# Patient Record
Sex: MALE | Race: WHITE | NOT HISPANIC OR LATINO
[De-identification: names, ages, dates, MRNs, and addresses within clinical notes are randomized per-mention and may not be internally consistent; named-entity substitution may affect disease eponyms.]

---

## 2017-02-06 ENCOUNTER — RESULT REVIEW (OUTPATIENT)
Age: 47
End: 2017-02-06

## 2019-01-26 ENCOUNTER — INPATIENT (INPATIENT)
Facility: HOSPITAL | Age: 49
LOS: 1 days | Discharge: HOME | DRG: 282 | End: 2019-01-28
Attending: STUDENT IN AN ORGANIZED HEALTH CARE EDUCATION/TRAINING PROGRAM | Admitting: STUDENT IN AN ORGANIZED HEALTH CARE EDUCATION/TRAINING PROGRAM
Payer: COMMERCIAL

## 2019-01-26 VITALS
HEART RATE: 78 BPM | RESPIRATION RATE: 16 BRPM | DIASTOLIC BLOOD PRESSURE: 78 MMHG | SYSTOLIC BLOOD PRESSURE: 115 MMHG | TEMPERATURE: 98 F | OXYGEN SATURATION: 96 %

## 2019-01-26 DIAGNOSIS — R07.9 CHEST PAIN, UNSPECIFIED: ICD-10-CM

## 2019-01-26 LAB
ALBUMIN SERPL ELPH-MCNC: 4.2 G/DL — SIGNIFICANT CHANGE UP (ref 3.3–5)
ALP SERPL-CCNC: 73 U/L — SIGNIFICANT CHANGE UP (ref 40–120)
ALT FLD-CCNC: 36 U/L — SIGNIFICANT CHANGE UP (ref 10–45)
ANION GAP SERPL CALC-SCNC: 12 MMOL/L — SIGNIFICANT CHANGE UP (ref 5–17)
APTT BLD: 28 SEC — SIGNIFICANT CHANGE UP (ref 27.5–36.3)
AST SERPL-CCNC: 16 U/L — SIGNIFICANT CHANGE UP (ref 10–40)
BILIRUB SERPL-MCNC: 0.4 MG/DL — SIGNIFICANT CHANGE UP (ref 0.2–1.2)
BUN SERPL-MCNC: 11 MG/DL — SIGNIFICANT CHANGE UP (ref 7–23)
CALCIUM SERPL-MCNC: 8.9 MG/DL — SIGNIFICANT CHANGE UP (ref 8.4–10.5)
CHLORIDE SERPL-SCNC: 102 MMOL/L — SIGNIFICANT CHANGE UP (ref 96–108)
CO2 SERPL-SCNC: 25 MMOL/L — SIGNIFICANT CHANGE UP (ref 22–31)
CREAT SERPL-MCNC: 0.92 MG/DL — SIGNIFICANT CHANGE UP (ref 0.5–1.3)
GLUCOSE SERPL-MCNC: 106 MG/DL — HIGH (ref 70–99)
HCT VFR BLD CALC: 41.6 % — SIGNIFICANT CHANGE UP (ref 39–50)
HGB BLD-MCNC: 14.7 G/DL — SIGNIFICANT CHANGE UP (ref 13–17)
INR BLD: 0.97 RATIO — SIGNIFICANT CHANGE UP (ref 0.88–1.16)
MCHC RBC-ENTMCNC: 31.4 PG — SIGNIFICANT CHANGE UP (ref 27–34)
MCHC RBC-ENTMCNC: 35.3 GM/DL — SIGNIFICANT CHANGE UP (ref 32–36)
MCV RBC AUTO: 88.9 FL — SIGNIFICANT CHANGE UP (ref 80–100)
PLATELET # BLD AUTO: 215 K/UL — SIGNIFICANT CHANGE UP (ref 150–400)
POTASSIUM SERPL-MCNC: 4 MMOL/L — SIGNIFICANT CHANGE UP (ref 3.5–5.3)
POTASSIUM SERPL-SCNC: 4 MMOL/L — SIGNIFICANT CHANGE UP (ref 3.5–5.3)
PROT SERPL-MCNC: 7.1 G/DL — SIGNIFICANT CHANGE UP (ref 6–8.3)
PROTHROM AB SERPL-ACNC: 11.1 SEC — SIGNIFICANT CHANGE UP (ref 10–12.9)
RBC # BLD: 4.68 M/UL — SIGNIFICANT CHANGE UP (ref 4.2–5.8)
RBC # FLD: 12.1 % — SIGNIFICANT CHANGE UP (ref 10.3–14.5)
SODIUM SERPL-SCNC: 139 MMOL/L — SIGNIFICANT CHANGE UP (ref 135–145)
TROPONIN T, HIGH SENSITIVITY RESULT: <6 NG/L — SIGNIFICANT CHANGE UP (ref 0–51)
WBC # BLD: 8.8 K/UL — SIGNIFICANT CHANGE UP (ref 3.8–10.5)
WBC # FLD AUTO: 8.8 K/UL — SIGNIFICANT CHANGE UP (ref 3.8–10.5)

## 2019-01-26 PROCEDURE — 71046 X-RAY EXAM CHEST 2 VIEWS: CPT | Mod: 26

## 2019-01-26 PROCEDURE — 99285 EMERGENCY DEPT VISIT HI MDM: CPT | Mod: GC,25

## 2019-01-26 PROCEDURE — 99223 1ST HOSP IP/OBS HIGH 75: CPT

## 2019-01-26 PROCEDURE — 93010 ELECTROCARDIOGRAM REPORT: CPT | Mod: GC

## 2019-01-26 RX ORDER — KETOROLAC TROMETHAMINE 30 MG/ML
15 SYRINGE (ML) INJECTION ONCE
Qty: 0 | Refills: 0 | Status: DISCONTINUED | OUTPATIENT
Start: 2019-01-26 | End: 2019-01-26

## 2019-01-26 RX ORDER — ACETAMINOPHEN 500 MG
1000 TABLET ORAL ONCE
Qty: 0 | Refills: 0 | Status: COMPLETED | OUTPATIENT
Start: 2019-01-26 | End: 2019-01-26

## 2019-01-26 RX ORDER — ASPIRIN/CALCIUM CARB/MAGNESIUM 324 MG
162 TABLET ORAL ONCE
Qty: 0 | Refills: 0 | Status: COMPLETED | OUTPATIENT
Start: 2019-01-26 | End: 2019-01-26

## 2019-01-26 RX ADMIN — Medication 400 MILLIGRAM(S): at 21:51

## 2019-01-26 RX ADMIN — Medication 162 MILLIGRAM(S): at 21:06

## 2019-01-26 RX ADMIN — Medication 1000 MILLIGRAM(S): at 23:26

## 2019-01-26 RX ADMIN — Medication 15 MILLIGRAM(S): at 21:51

## 2019-01-26 NOTE — H&P ADULT - NSHPLABSRESULTS_GEN_ALL_CORE
Labs, imaging and EKG personally reviewed and interpreted by me - normal electrolytes, LFTs, hs trop <6, repeat pending. CXR with no focal consolidation, no effusions. EKG NSR no acute ischemic changes, no TWI/q waves, no changes from prior EKG (2015).                           14.7   8.8   )-----------( 215      ( 26 Jan 2019 20:57 )             41.6     01-26    139  |  102  |  11  ----------------------------<  106<H>  4.0   |  25  |  0.92    Ca    8.9      26 Jan 2019 20:57    TPro  7.1  /  Alb  4.2  /  TBili  0.4  /  DBili  x   /  AST  16  /  ALT  36  /  AlkPhos  73  01-26      PT/INR - ( 26 Jan 2019 20:57 )   PT: 11.1 sec;   INR: 0.97 ratio    PTT - ( 26 Jan 2019 20:57 )  PTT:28.0 sec    Troponin T, High Sensitivity Result: <6     < from: Xray Chest 2 Views PA/Lat (01.26.19 @ 21:32) >  ******PRELIMINARY REPORT******        INTERPRETATION:  Clear lungs

## 2019-01-26 NOTE — ED ADULT NURSE REASSESSMENT NOTE - NS ED NURSE REASSESS COMMENT FT1
As per MD Dai awaiting patient's blood lab results to order appropriate pain medication, patient's blood pressure systollically has been consistently 104, with diastolic 70's-80s. Safety maintained for patient.

## 2019-01-26 NOTE — H&P ADULT - PROBLEM SELECTOR PLAN 2
CAD s/p 5 stents in past   last cath 7/2015 showing patents stents and no disease   c/w ASA 81mg, lipitor 80mg, toprol 25mg and losartan 25mg

## 2019-01-26 NOTE — ED ADULT NURSE NOTE - NSIMPLEMENTINTERV_GEN_ALL_ED
Implemented All Fall with Harm Risk Interventions:  [place] to call system. Call bell, personal items and telephone within reach. Instruct patient to call for assistance. Room bathroom lighting operational. Non-slip footwear when patient is off stretcher. Physically safe environment: no spills, clutter or unnecessary equipment. Stretcher in lowest position, wheels locked, appropriate side rails in place. Provide visual cue, wrist band, yellow gown, etc. Monitor gait and stability. Monitor for mental status changes and reorient to person, place, and time. Review medications for side effects contributing to fall risk. Reinforce activity limits and safety measures with patient and family. Provide visual clues: red socks.

## 2019-01-26 NOTE — H&P ADULT - NSHPREVIEWOFSYSTEMS_GEN_ALL_CORE
REVIEW OF SYSTEMS:    CONSTITUTIONAL: No weakness, fevers, chills  EYES/ENT: No visual changes, throat pain   NECK: No pain or stiffness  RESPIRATORY: +mild cough, no wheezing, no shortness of breath  CARDIOVASCULAR: +chest pain, +palpitations, no LE edema   GASTROINTESTINAL: +nausea, no vomiting, no abdominal pain, no BRBPR  GENITOURINARY: no polyuria, no dysuria, no hematuria   NEUROLOGICAL: +numbness in LE fingers, no headaches, no confusion   MUSCULOSKELETAL: no back pain, no weakness   SKIN: No rashes, or lesions   PSYCH: no anxiety, depression

## 2019-01-26 NOTE — H&P ADULT - NSHPSOCIALHISTORY_GEN_ALL_CORE
former smoker (quit 3 years ago, 15 pack years), no etoh, no drugs   on disability, lives at home with wife and children

## 2019-01-26 NOTE — ED PROVIDER NOTE - NS ED ROS FT
CONSTITUTIONAL: No fevers, no chills  Eyes: no visual changes  Ears: no ear drainage, no ear pain  Nose: no nasal congestion  Mouth/Throat: no sore throat  Cardiovascular: + Chest pain  Respiratory: + SOB  Gastrointestinal: +nausea, no abd pain  Genitourinary: no dysuria, no hematuria  SKIN: no rashes.  NEURO: no headache

## 2019-01-26 NOTE — ED ADULT NURSE NOTE - CHPI ED NUR SYMPTOMS NEG
no diaphoresis/no fever/no nausea/no syncope/no congestion/no dizziness/no back pain/no chills/no shortness of breath/no vomiting

## 2019-01-26 NOTE — ED ADULT NURSE NOTE - OBJECTIVE STATEMENT
49 YOM A&Ox3 brought in by EMS to ED for constant 5/10 squeezing chest pain that radiates to left arm that began at 5pm today. Patient stated was home and did not feel well, went to urgent care who sent patient to [facility]. Patient placed on cardiac monitor shows NSR. Patient denies SOB, N/V/D, dizziness, diaphoresis, headache & blurry vision at this time. Cardiac history includes 5 stents and hypertension. Patient stated took all home medications today.

## 2019-01-26 NOTE — H&P ADULT - ASSESSMENT
49M with CAD s/p 5 stents (FITO x1 1st OM, x3 mCx 2014; FITO x1 pLAD 2015), HTN, ADHD, depression/anxiety p/w chest pain, admitted for further work-up.

## 2019-01-26 NOTE — ED PROVIDER NOTE - ATTENDING CONTRIBUTION TO CARE
Nemes - 50yo M pmh CAD with 5 stents, HTN, HLD p/w severe midsternal chest pain 1 hour prior to arrival with associated diaphoresis, waxing/waning, min SOB. Persistent CP upon exam but better. No other stx. VS wnl, well appearing, lungs/cardiac wnl, no JVD, no pedal edema. Low suspicion PE/dissection, likely ACS, will get cardiac w/u, admit

## 2019-01-26 NOTE — H&P ADULT - PROBLEM SELECTOR PLAN 6
pt with Barrets esophagus, s/p recent EGD per pt report showing no dysplasia   c/w protonix 40 BID  c/w reglan 5 mg QID

## 2019-01-26 NOTE — H&P ADULT - NSHPPHYSICALEXAM_GEN_ALL_CORE
Vital Signs Last 24 Hrs  T(C): 36.4 (26 Jan 2019 23:25), Max: 36.6 (26 Jan 2019 20:51)  T(F): 97.5 (26 Jan 2019 23:25), Max: 97.9 (26 Jan 2019 20:51)  HR: 86 (26 Jan 2019 23:25) (70 - 86)  BP: 106/77 (26 Jan 2019 23:25) (104/71 - 115/78)  BP(mean): --  RR: 20 (26 Jan 2019 23:25) (16 - 20)  SpO2: 98% (26 Jan 2019 23:25) (96% - 98%)    PHYSICAL EXAM:  GENERAL: NAD, well-developed  HEAD:  Atraumatic, normocephalic  EYES: EOMI, PERRLA, conjunctiva and sclera clear  NECK: Supple, no JVD, moist MM  CHEST/LUNG: Clear to auscultation bilaterally; no wheezing or rales  HEART: Regular rate and rhythm; no murmurs  ABDOMEN: Soft, nontender, nondistended; bowel sounds present  EXTREMITIES:  2+ Peripheral Pulses, no clubbing, cyanosis, or edema  PSYCH: calm affect, not anxious  NEUROLOGY: non-focal, AAOx3  SKIN: No rashes or lesions  MUSCULOSKELETAL: no back pain, moving all extremities

## 2019-01-26 NOTE — ED ADULT NURSE REASSESSMENT NOTE - NS ED NURSE REASSESS COMMENT FT1
Patient at this time appear to be in no acute distress, vital signs are stable. Patient states chest pain decreased to 3/10, patient denies sob, n/v/d, headache, blurry vision. Patient admitted, awaiting bed.

## 2019-01-26 NOTE — ED ADULT NURSE NOTE - RADIATION
Patient's appear to be currently compensated    Continue Aldactone, Lasix and Zaroxolyn  Patient is status post AICD arm/left arm

## 2019-01-26 NOTE — H&P ADULT - HISTORY OF PRESENT ILLNESS
49M with CAD s/p 5 stents (FITO x1 1st OM, x3 mCx 2014; FITO x1 pLAD 2015), HTN, ADHD, depression/anxiety p/w chest pain. Pt states around 6PM on evening of arrival started feeling nauseous, lightheaded and flushed while driving to [facility]. On arrival home around 7PM, started having acute onset chest pain, substernal, 9/10, squeezing in nature, radiating to L shoulder and arm, with numbness/tingling in fingertips, mild palpitations, no SOB, no vomiting; nothing made the pain better or worse, no positional component to pain. States pain is similar to CP he's had in the past when he has required stenting.     Pt took an old nitro SL he ahd at home without any improvement in pain; then took ASA 162mg and went to  where he was told to come to ED. Got nitro en route with EMS and states pain was moderately relieved after that. Rcvd another 162mg of ASA in ED.  Currently states pain is 3/10, mild tingling in fingertips, otherwise denies symptoms. Recently has been getting over a mild URI, endorsing cough and congestion; no fevers, chills, no CP prior to today, no SOB, no  ir GI symptoms, no LE edema.      No family hx of early CAD; elderly father with recent CAD with stent placement at age 80.

## 2019-01-26 NOTE — ED PROVIDER NOTE - OBJECTIVE STATEMENT
49 YOM pmh CAD with 5 stents, HTN, HLD p/w severe midsternal chest pain 1 hour prior to arrival with associated diaphoresis that resolved and slight SOB that resolved. pt states he was driving to airport when he started to get a little diaphoretic and SOB and nauseated and then started to develop midsternal chest pain. Pt took nitro at home which improved pain, also took 162mg aspirin. Pt also got nitro per ems which resolved pain. pt states pain is similar to past episodes.

## 2019-01-26 NOTE — H&P ADULT - PROBLEM SELECTOR PLAN 4
pt on klonopin 0.5mg TID prn, trazadone 50 qh  iSTOP #[id number], klonopin dose verified   will c/w 0.5mg TID prn anxiety

## 2019-01-26 NOTE — H&P ADULT - PROBLEM SELECTOR PLAN 1
acute on set SS CP rad to arms, in setting of hx of extensive CAD, HTN and former smoker  EKG without ischemic changes  hs trop <6, repeat pending   serial EKGs  if trop rising, will c/t trend   s/p ASA load - c/w ASA 81mg and high dose statin for now  c/w BB and ARB   monitor on tele   check TTE   cardiology consulted for possible stress vs cath  nitro prn for chest pain if BP allows

## 2019-01-26 NOTE — ED PROVIDER NOTE - MEDICAL DECISION MAKING DETAILS
Chest pain with significant hx, previously stented multiple times will get labs, workup for ACS, ekg without stemi currently. Will admit for cath.

## 2019-01-27 DIAGNOSIS — I10 ESSENTIAL (PRIMARY) HYPERTENSION: ICD-10-CM

## 2019-01-27 DIAGNOSIS — G47.00 INSOMNIA, UNSPECIFIED: ICD-10-CM

## 2019-01-27 DIAGNOSIS — F41.9 ANXIETY DISORDER, UNSPECIFIED: ICD-10-CM

## 2019-01-27 DIAGNOSIS — I25.10 ATHEROSCLEROTIC HEART DISEASE OF NATIVE CORONARY ARTERY WITHOUT ANGINA PECTORIS: ICD-10-CM

## 2019-01-27 DIAGNOSIS — Z29.9 ENCOUNTER FOR PROPHYLACTIC MEASURES, UNSPECIFIED: ICD-10-CM

## 2019-01-27 DIAGNOSIS — F32.9 MAJOR DEPRESSIVE DISORDER, SINGLE EPISODE, UNSPECIFIED: ICD-10-CM

## 2019-01-27 DIAGNOSIS — E78.5 HYPERLIPIDEMIA, UNSPECIFIED: ICD-10-CM

## 2019-01-27 DIAGNOSIS — I21.4 NON-ST ELEVATION (NSTEMI) MYOCARDIAL INFARCTION: ICD-10-CM

## 2019-01-27 DIAGNOSIS — R07.9 CHEST PAIN, UNSPECIFIED: ICD-10-CM

## 2019-01-27 DIAGNOSIS — F90.9 ATTENTION-DEFICIT HYPERACTIVITY DISORDER, UNSPECIFIED TYPE: ICD-10-CM

## 2019-01-27 DIAGNOSIS — K22.70 BARRETT'S ESOPHAGUS WITHOUT DYSPLASIA: ICD-10-CM

## 2019-01-27 LAB
ANION GAP SERPL CALC-SCNC: 11 MMOL/L — SIGNIFICANT CHANGE UP (ref 5–17)
ANION GAP SERPL CALC-SCNC: 12 MMOL/L — SIGNIFICANT CHANGE UP (ref 5–17)
APTT BLD: 48.8 SEC — HIGH (ref 27.5–36.3)
BUN SERPL-MCNC: 13 MG/DL — SIGNIFICANT CHANGE UP (ref 7–23)
BUN SERPL-MCNC: 14 MG/DL — SIGNIFICANT CHANGE UP (ref 7–23)
CALCIUM SERPL-MCNC: 8.5 MG/DL — SIGNIFICANT CHANGE UP (ref 8.4–10.5)
CALCIUM SERPL-MCNC: 8.7 MG/DL — SIGNIFICANT CHANGE UP (ref 8.4–10.5)
CHLORIDE SERPL-SCNC: 103 MMOL/L — SIGNIFICANT CHANGE UP (ref 96–108)
CHLORIDE SERPL-SCNC: 104 MMOL/L — SIGNIFICANT CHANGE UP (ref 96–108)
CK MB BLD-MCNC: 2.7 % — SIGNIFICANT CHANGE UP (ref 0–3.5)
CK MB BLD-MCNC: 3.4 % — SIGNIFICANT CHANGE UP (ref 0–3.5)
CK MB CFR SERPL CALC: 4.2 NG/ML — SIGNIFICANT CHANGE UP (ref 0–6.7)
CK MB CFR SERPL CALC: 5.1 NG/ML — SIGNIFICANT CHANGE UP (ref 0–6.7)
CK SERPL-CCNC: 152 U/L — SIGNIFICANT CHANGE UP (ref 30–200)
CK SERPL-CCNC: 156 U/L — SIGNIFICANT CHANGE UP (ref 30–200)
CO2 SERPL-SCNC: 23 MMOL/L — SIGNIFICANT CHANGE UP (ref 22–31)
CO2 SERPL-SCNC: 24 MMOL/L — SIGNIFICANT CHANGE UP (ref 22–31)
CREAT SERPL-MCNC: 0.95 MG/DL — SIGNIFICANT CHANGE UP (ref 0.5–1.3)
CREAT SERPL-MCNC: 1.01 MG/DL — SIGNIFICANT CHANGE UP (ref 0.5–1.3)
GLUCOSE SERPL-MCNC: 90 MG/DL — SIGNIFICANT CHANGE UP (ref 70–99)
GLUCOSE SERPL-MCNC: 99 MG/DL — SIGNIFICANT CHANGE UP (ref 70–99)
HCT VFR BLD CALC: 38 % — LOW (ref 39–50)
HGB BLD-MCNC: 13.5 G/DL — SIGNIFICANT CHANGE UP (ref 13–17)
MAGNESIUM SERPL-MCNC: 1.8 MG/DL — SIGNIFICANT CHANGE UP (ref 1.6–2.6)
MCHC RBC-ENTMCNC: 31.8 PG — SIGNIFICANT CHANGE UP (ref 27–34)
MCHC RBC-ENTMCNC: 35.5 GM/DL — SIGNIFICANT CHANGE UP (ref 32–36)
MCV RBC AUTO: 89.4 FL — SIGNIFICANT CHANGE UP (ref 80–100)
PHOSPHATE SERPL-MCNC: 4.2 MG/DL — SIGNIFICANT CHANGE UP (ref 2.5–4.5)
PLATELET # BLD AUTO: 187 K/UL — SIGNIFICANT CHANGE UP (ref 150–400)
POTASSIUM SERPL-MCNC: 3.6 MMOL/L — SIGNIFICANT CHANGE UP (ref 3.5–5.3)
POTASSIUM SERPL-MCNC: 4 MMOL/L — SIGNIFICANT CHANGE UP (ref 3.5–5.3)
POTASSIUM SERPL-SCNC: 3.6 MMOL/L — SIGNIFICANT CHANGE UP (ref 3.5–5.3)
POTASSIUM SERPL-SCNC: 4 MMOL/L — SIGNIFICANT CHANGE UP (ref 3.5–5.3)
RBC # BLD: 4.25 M/UL — SIGNIFICANT CHANGE UP (ref 4.2–5.8)
RBC # FLD: 11.9 % — SIGNIFICANT CHANGE UP (ref 10.3–14.5)
SODIUM SERPL-SCNC: 138 MMOL/L — SIGNIFICANT CHANGE UP (ref 135–145)
SODIUM SERPL-SCNC: 139 MMOL/L — SIGNIFICANT CHANGE UP (ref 135–145)
TROPONIN T, HIGH SENSITIVITY RESULT: 37 NG/L — SIGNIFICANT CHANGE UP (ref 0–51)
TROPONIN T, HIGH SENSITIVITY RESULT: 61 NG/L — HIGH (ref 0–51)
TROPONIN T, HIGH SENSITIVITY RESULT: 63 NG/L — HIGH (ref 0–51)
WBC # BLD: 6.3 K/UL — SIGNIFICANT CHANGE UP (ref 3.8–10.5)
WBC # FLD AUTO: 6.3 K/UL — SIGNIFICANT CHANGE UP (ref 3.8–10.5)

## 2019-01-27 PROCEDURE — 99223 1ST HOSP IP/OBS HIGH 75: CPT

## 2019-01-27 PROCEDURE — 99233 SBSQ HOSP IP/OBS HIGH 50: CPT

## 2019-01-27 PROCEDURE — 93010 ELECTROCARDIOGRAM REPORT: CPT

## 2019-01-27 PROCEDURE — 93010 ELECTROCARDIOGRAM REPORT: CPT | Mod: 76

## 2019-01-27 RX ORDER — PANTOPRAZOLE SODIUM 20 MG/1
0 TABLET, DELAYED RELEASE ORAL
Qty: 0 | Refills: 0 | COMMUNITY

## 2019-01-27 RX ORDER — ENOXAPARIN SODIUM 100 MG/ML
40 INJECTION SUBCUTANEOUS EVERY 24 HOURS
Qty: 0 | Refills: 0 | Status: DISCONTINUED | OUTPATIENT
Start: 2019-01-27 | End: 2019-01-27

## 2019-01-27 RX ORDER — METOPROLOL TARTRATE 50 MG
25 TABLET ORAL DAILY
Qty: 0 | Refills: 0 | Status: DISCONTINUED | OUTPATIENT
Start: 2019-01-27 | End: 2019-01-27

## 2019-01-27 RX ORDER — CLONAZEPAM 1 MG
1 TABLET ORAL
Qty: 0 | Refills: 0 | COMMUNITY

## 2019-01-27 RX ORDER — ACETAMINOPHEN 500 MG
650 TABLET ORAL EVERY 6 HOURS
Qty: 0 | Refills: 0 | Status: DISCONTINUED | OUTPATIENT
Start: 2019-01-27 | End: 2019-01-28

## 2019-01-27 RX ORDER — LOSARTAN POTASSIUM 100 MG/1
25 TABLET, FILM COATED ORAL DAILY
Qty: 0 | Refills: 0 | Status: DISCONTINUED | OUTPATIENT
Start: 2019-01-27 | End: 2019-01-27

## 2019-01-27 RX ORDER — TICAGRELOR 90 MG/1
180 TABLET ORAL ONCE
Qty: 0 | Refills: 0 | Status: COMPLETED | OUTPATIENT
Start: 2019-01-27 | End: 2019-01-27

## 2019-01-27 RX ORDER — ESZOPICLONE 2 MG/1
1 TABLET, COATED ORAL
Qty: 0 | Refills: 0 | COMMUNITY

## 2019-01-27 RX ORDER — HEPARIN SODIUM 5000 [USP'U]/ML
6000 INJECTION INTRAVENOUS; SUBCUTANEOUS EVERY 6 HOURS
Qty: 0 | Refills: 0 | Status: DISCONTINUED | OUTPATIENT
Start: 2019-01-27 | End: 2019-01-28

## 2019-01-27 RX ORDER — ATORVASTATIN CALCIUM 80 MG/1
80 TABLET, FILM COATED ORAL AT BEDTIME
Qty: 0 | Refills: 0 | Status: DISCONTINUED | OUTPATIENT
Start: 2019-01-27 | End: 2019-01-28

## 2019-01-27 RX ORDER — METOCLOPRAMIDE HCL 10 MG
5 TABLET ORAL
Qty: 0 | Refills: 0 | Status: DISCONTINUED | OUTPATIENT
Start: 2019-01-27 | End: 2019-01-28

## 2019-01-27 RX ORDER — ASPIRIN/CALCIUM CARB/MAGNESIUM 324 MG
81 TABLET ORAL DAILY
Qty: 0 | Refills: 0 | Status: DISCONTINUED | OUTPATIENT
Start: 2019-01-27 | End: 2019-01-28

## 2019-01-27 RX ORDER — PANTOPRAZOLE SODIUM 20 MG/1
40 TABLET, DELAYED RELEASE ORAL
Qty: 0 | Refills: 0 | Status: DISCONTINUED | OUTPATIENT
Start: 2019-01-27 | End: 2019-01-28

## 2019-01-27 RX ORDER — ESCITALOPRAM OXALATE 10 MG/1
1 TABLET, FILM COATED ORAL
Qty: 0 | Refills: 0 | COMMUNITY

## 2019-01-27 RX ORDER — ESCITALOPRAM OXALATE 10 MG/1
20 TABLET, FILM COATED ORAL DAILY
Qty: 0 | Refills: 0 | Status: DISCONTINUED | OUTPATIENT
Start: 2019-01-27 | End: 2019-01-28

## 2019-01-27 RX ORDER — HEPARIN SODIUM 5000 [USP'U]/ML
INJECTION INTRAVENOUS; SUBCUTANEOUS
Qty: 25000 | Refills: 0 | Status: DISCONTINUED | OUTPATIENT
Start: 2019-01-27 | End: 2019-01-28

## 2019-01-27 RX ORDER — ENOXAPARIN SODIUM 100 MG/ML
120 INJECTION SUBCUTANEOUS EVERY 12 HOURS
Qty: 0 | Refills: 0 | Status: DISCONTINUED | OUTPATIENT
Start: 2019-01-27 | End: 2019-01-27

## 2019-01-27 RX ORDER — LOSARTAN POTASSIUM 100 MG/1
1 TABLET, FILM COATED ORAL
Qty: 0 | Refills: 0 | COMMUNITY

## 2019-01-27 RX ORDER — NITROGLYCERIN 6.5 MG
0.4 CAPSULE, EXTENDED RELEASE ORAL ONCE
Qty: 0 | Refills: 0 | Status: COMPLETED | OUTPATIENT
Start: 2019-01-27 | End: 2019-01-27

## 2019-01-27 RX ORDER — CLONAZEPAM 1 MG
0.5 TABLET ORAL THREE TIMES A DAY
Qty: 0 | Refills: 0 | Status: DISCONTINUED | OUTPATIENT
Start: 2019-01-27 | End: 2019-01-28

## 2019-01-27 RX ORDER — METHYLPHENIDATE HCL 5 MG
10 TABLET ORAL DAILY
Qty: 0 | Refills: 0 | Status: DISCONTINUED | OUTPATIENT
Start: 2019-01-27 | End: 2019-01-27

## 2019-01-27 RX ORDER — NITROGLYCERIN 6.5 MG
10 CAPSULE, EXTENDED RELEASE ORAL
Qty: 50 | Refills: 0 | Status: DISCONTINUED | OUTPATIENT
Start: 2019-01-27 | End: 2019-01-28

## 2019-01-27 RX ORDER — TRAZODONE HCL 50 MG
50 TABLET ORAL AT BEDTIME
Qty: 0 | Refills: 0 | Status: DISCONTINUED | OUTPATIENT
Start: 2019-01-27 | End: 2019-01-28

## 2019-01-27 RX ORDER — TRAZODONE HCL 50 MG
0 TABLET ORAL
Qty: 0 | Refills: 0 | COMMUNITY

## 2019-01-27 RX ORDER — METOCLOPRAMIDE HCL 10 MG
1 TABLET ORAL
Qty: 0 | Refills: 0 | COMMUNITY

## 2019-01-27 RX ORDER — ZOLPIDEM TARTRATE 10 MG/1
5 TABLET ORAL AT BEDTIME
Qty: 0 | Refills: 0 | Status: DISCONTINUED | OUTPATIENT
Start: 2019-01-27 | End: 2019-01-28

## 2019-01-27 RX ORDER — TICAGRELOR 90 MG/1
90 TABLET ORAL
Qty: 0 | Refills: 0 | Status: DISCONTINUED | OUTPATIENT
Start: 2019-01-28 | End: 2019-01-28

## 2019-01-27 RX ORDER — ISOSORBIDE MONONITRATE 60 MG/1
30 TABLET, EXTENDED RELEASE ORAL DAILY
Qty: 0 | Refills: 0 | Status: DISCONTINUED | OUTPATIENT
Start: 2019-01-27 | End: 2019-01-27

## 2019-01-27 RX ORDER — METHYLPHENIDATE HCL 5 MG
1 TABLET ORAL
Qty: 0 | Refills: 0 | COMMUNITY

## 2019-01-27 RX ADMIN — ENOXAPARIN SODIUM 120 MILLIGRAM(S): 100 INJECTION SUBCUTANEOUS at 04:50

## 2019-01-27 RX ADMIN — Medication 650 MILLIGRAM(S): at 20:15

## 2019-01-27 RX ADMIN — PANTOPRAZOLE SODIUM 40 MILLIGRAM(S): 20 TABLET, DELAYED RELEASE ORAL at 05:03

## 2019-01-27 RX ADMIN — LOSARTAN POTASSIUM 25 MILLIGRAM(S): 100 TABLET, FILM COATED ORAL at 05:03

## 2019-01-27 RX ADMIN — HEPARIN SODIUM 1000 UNIT(S)/HR: 5000 INJECTION INTRAVENOUS; SUBCUTANEOUS at 17:11

## 2019-01-27 RX ADMIN — Medication 10 MILLIGRAM(S): at 09:09

## 2019-01-27 RX ADMIN — Medication 50 MILLIGRAM(S): at 22:09

## 2019-01-27 RX ADMIN — Medication 0.4 MILLIGRAM(S): at 01:57

## 2019-01-27 RX ADMIN — TICAGRELOR 180 MILLIGRAM(S): 90 TABLET ORAL at 04:50

## 2019-01-27 RX ADMIN — Medication 81 MILLIGRAM(S): at 09:09

## 2019-01-27 RX ADMIN — Medication 5 MILLIGRAM(S): at 13:14

## 2019-01-27 RX ADMIN — ESCITALOPRAM OXALATE 20 MILLIGRAM(S): 10 TABLET, FILM COATED ORAL at 09:09

## 2019-01-27 RX ADMIN — Medication 3 MICROGRAM(S)/MIN: at 17:11

## 2019-01-27 RX ADMIN — Medication 5 MILLIGRAM(S): at 22:09

## 2019-01-27 RX ADMIN — Medication 5 MILLIGRAM(S): at 17:11

## 2019-01-27 RX ADMIN — Medication 5 MILLIGRAM(S): at 09:09

## 2019-01-27 RX ADMIN — Medication 0.5 MILLIGRAM(S): at 22:10

## 2019-01-27 RX ADMIN — Medication 650 MILLIGRAM(S): at 21:17

## 2019-01-27 RX ADMIN — ATORVASTATIN CALCIUM 80 MILLIGRAM(S): 80 TABLET, FILM COATED ORAL at 21:17

## 2019-01-27 RX ADMIN — ISOSORBIDE MONONITRATE 30 MILLIGRAM(S): 60 TABLET, EXTENDED RELEASE ORAL at 04:50

## 2019-01-27 RX ADMIN — Medication 0.4 MILLIGRAM(S): at 16:37

## 2019-01-27 NOTE — CONSULT NOTE ADULT - ATTENDING COMMENTS
Thank you. My team will follow.    Wali Harrison M.D, F.A.C.C  [facility]   [phone]

## 2019-01-27 NOTE — PROVIDER CONTACT NOTE (CRITICAL VALUE NOTIFICATION) - ACTION/TREATMENT ORDERED:
NP notified and aware. NP to come and assess the patient. Patient is sable at this time. Will continue to monitor

## 2019-01-27 NOTE — CHART NOTE - NSCHARTNOTEFT_GEN_A_CORE
TRISTEN BARR    Notified by RN patient with critical lab result  Troponin  63   patient without complaints at this time of result   CARDIAC MARKERS ( 27 Jan 2019 12:39 )  x     / x     / 152 U/L / x     / 5.1 ng/mL    Vital Signs Last 24 Hrs  T(C): 36.5 (27 Jan 2019 16:45), Max: 36.6 (26 Jan 2019 20:51)  T(F): 97.7 (27 Jan 2019 16:45), Max: 97.9 (26 Jan 2019 20:51)  HR: 66 (27 Jan 2019 16:45) (61 - 86)  BP: 94/68 (27 Jan 2019 16:45) (94/55 - 118/76)  BP(mean): 77 (27 Jan 2019 16:45) (77 - 77)  RR: 19 (27 Jan 2019 16:45) (16 - 20)  SpO2: 95% (27 Jan 2019 16:45) (95% - 98%)    HPI:  49M with CAD s/p 5 stents (FITO x1 1st OM, x3 mCx 2014; FITO x1 pLAD 2015), HTN, ADHD, depression/anxiety p/w chest pain. Pt states around 6PM on evening of arrival started feeling nauseous, lightheaded and flushed while driving to [facility]. On arrival home around 7PM, started having acute onset chest pain, substernal, 9/10, squeezing in nature, radiating to L shoulder and arm, with numbness/tingling in fingertips, mild palpitations, no SOB, no vomiting; nothing made the pain better or worse, no positional component to pain. States pain is similar to CP he's had in the past when he has required stenting.     Pt took an old nitro SL he ahd at home without any improvement in pain; then took ASA 162mg and went to  where he was told to come to ED. Got nitro en route with EMS and states pain was moderately relieved after that. Rcvd another 162mg of ASA in ED.  No family hx of early CAD; elderly father with recent CAD with stent placement at age 80. (26 Jan 2019 23:50)    # NSTEMI   Interventions taken   Monitor vital signs   Monitor on Tele   continue with current medications   Patient transfer to PICU   Cardiology Appreciate  Spoke with Dr Elise agreed with above paln see note                            Michela Lara NP-C

## 2019-01-27 NOTE — PROGRESS NOTE ADULT - SUBJECTIVE AND OBJECTIVE BOX
Patient is a 49y old  Male who presents with a chief complaint of chest pain (27 Jan 2019 04:51)      SUBJECTIVE / OVERNIGHT EVENTS:  Patient got winded while walking, mild diaphoresis upon getting back into bed.  Non toxic appearing.     MEDICATIONS  (STANDING):  aspirin enteric coated 81 milliGRAM(s) Oral daily  atorvastatin 80 milliGRAM(s) Oral at bedtime  enoxaparin Injectable 120 milliGRAM(s) SubCutaneous every 12 hours  escitalopram 20 milliGRAM(s) Oral daily  isosorbide   mononitrate ER Tablet (IMDUR) 30 milliGRAM(s) Oral daily  losartan 25 milliGRAM(s) Oral daily  methylphenidate 10 milliGRAM(s) Oral daily  metoclopramide 5 milliGRAM(s) Oral Before meals and at bedtime  metoprolol succinate ER 25 milliGRAM(s) Oral daily  pantoprazole    Tablet 40 milliGRAM(s) Oral before breakfast  traZODone 50 milliGRAM(s) Oral at bedtime    MEDICATIONS  (PRN):  clonazePAM Tablet 0.5 milliGRAM(s) Oral three times a day PRN anxiety  zolpidem 5 milliGRAM(s) Oral at bedtime PRN Insomnia      CAPILLARY BLOOD GLUCOSE        I&O's Summary    26 Jan 2019 07:01  -  27 Jan 2019 07:00  --------------------------------------------------------  IN: 240 mL / OUT: 0 mL / NET: 240 mL    27 Jan 2019 07:01  -  27 Jan 2019 12:09  --------------------------------------------------------  IN: 240 mL / OUT: 0 mL / NET: 240 mL        PHYSICAL EXAM:  Vital Signs Last 24 Hrs  T(C): 36.4 (27 Jan 2019 04:24), Max: 36.6 (26 Jan 2019 20:51)  T(F): 97.6 (27 Jan 2019 04:24), Max: 97.9 (26 Jan 2019 20:51)  HR: 76 (27 Jan 2019 09:07) (61 - 86)  BP: 97/62 (27 Jan 2019 09:07) (97/62 - 118/76)  BP(mean): --  RR: 18 (27 Jan 2019 04:24) (16 - 20)  SpO2: 97% (27 Jan 2019 04:24) (95% - 98%)  GENERAL: NAD, well-developed  HEAD:  Atraumatic, Normocephalic  EYES: EOMI, PERRLA, conjunctiva and sclera clear  NECK: Supple, No JVD  CHEST/LUNG: Clear to auscultation bilaterally; No wheeze  HEART: Regular rate and rhythm; No murmurs, rubs, or gallops  ABDOMEN: Soft, Nontender, Nondistended; Bowel sounds present  EXTREMITIES:  2+ Peripheral Pulses, No clubbing, cyanosis, or edema  PSYCH: AAOx3  NEUROLOGY: non-focal  SKIN: No rashes or lesions    LABS:                        13.5   6.3   )-----------( 187      ( 27 Jan 2019 06:41 )             38.0     01-27    139  |  103  |  13  ----------------------------<  99  3.6   |  24  |  0.95    Ca    8.5      27 Jan 2019 06:41  Phos  4.2     01-27  Mg     1.8     01-27    TPro  7.1  /  Alb  4.2  /  TBili  0.4  /  DBili  x   /  AST  16  /  ALT  36  /  AlkPhos  73  01-26    PT/INR - ( 26 Jan 2019 20:57 )   PT: 11.1 sec;   INR: 0.97 ratio         PTT - ( 26 Jan 2019 20:57 )  PTT:28.0 sec          RADIOLOGY & ADDITIONAL TESTS:    Imaging Personally Reviewed:    Consultant(s) Notes Reviewed:      Care Discussed with Consultants/Other Providers:

## 2019-01-27 NOTE — CONSULT NOTE ADULT - ASSESSMENT
49M with CAD s/p 5 stents (FITO x1 1st OM, x3 mCx 2014; FITO x1 pLAD 2015), HTN, ADHD, depression/anxiety p/w chest pain.

## 2019-01-27 NOTE — PROGRESS NOTE ADULT - PROBLEM SELECTOR PLAN 4
pt on klonopin 0.5mg TID prn, trazadone 50 qh  iSTOP #[id number], klonopin dose verified   will c/w 0.5mg TID prn anxiety  - Ritalin to be discontinued in light of possible active heart issues.

## 2019-01-27 NOTE — PROGRESS NOTE ADULT - PROBLEM SELECTOR PLAN 4
pt on klonopin 0.5mg TID prn, trazadone 50 qh  iSTOP #[id number], klonopin dose verified   will c/w 0.5mg TID prn anxiety  - Ritalin to be discontinued in light of possible active heart issues.

## 2019-01-27 NOTE — CHART NOTE - NSCHARTNOTEFT_GEN_A_CORE
With minimal exertion, experiencing same symptoms (cold/clammy).  BP slightly lower albeit; 2 hours after morning meds.   Monitoring vitals, exam, EKG, Trops.   Check EKG now.  Move to PICU.   Discussed with Dr. Navarro.

## 2019-01-27 NOTE — CONSULT NOTE ADULT - PROBLEM SELECTOR RECOMMENDATION 9
Pursue invasive strategy. Cardiac cath Monday morning for evaluation of existing CAD and patency of stents. Start lovenox 1mg/kg SC q12hr, brilinta 180mg po loading dose followed by 90mg po bid, ASA 81mg po daily, isosorbide mononitrate 30mg po daily, metoprolol xl 25mg po daily, lipitor 80mg po daily. Telemetry monitoring. Keep Mg>2.0. K>4.0  Daily EKG's. TTE for evaluation of LV function and r/o valvular abnormalities.

## 2019-01-27 NOTE — PROGRESS NOTE ADULT - SUBJECTIVE AND OBJECTIVE BOX
Patient is a 49y old  Male who presents with a chief complaint of chest pain (27 Jan 2019 12:08)      Overnight Event;  no acute events     REVIEW OF SYSTEMS: denies cp/ shortness of breath c/o 2/10 headache   	    MEDICATIONS  (STANDING):  aspirin enteric coated 81 milliGRAM(s) Oral daily  atorvastatin 80 milliGRAM(s) Oral at bedtime  escitalopram 20 milliGRAM(s) Oral daily  heparin  Infusion.  Unit(s)/Hr (10 mL/Hr) IV Continuous <Continuous>  metoclopramide 5 milliGRAM(s) Oral Before meals and at bedtime  nitroglycerin  Infusion 10 MICROgram(s)/Min (3 mL/Hr) IV Continuous <Continuous>  pantoprazole    Tablet 40 milliGRAM(s) Oral before breakfast  traZODone 50 milliGRAM(s) Oral at bedtime    MEDICATIONS  (PRN):  acetaminophen   Tablet .. 650 milliGRAM(s) Oral every 6 hours PRN Mild Pain (1 - 3)  clonazePAM Tablet 0.5 milliGRAM(s) Oral three times a day PRN anxiety  heparin  Injectable 6000 Unit(s) IV Push every 6 hours PRN For aPTT less than 40  zolpidem 5 milliGRAM(s) Oral at bedtime PRN Insomnia        PHYSICAL EXAM:  Vital Signs Last 24 Hrs  T(C): 37 (27 Jan 2019 19:00), Max: 37 (27 Jan 2019 19:00)  T(F): 98.6 (27 Jan 2019 19:00), Max: 98.6 (27 Jan 2019 19:00)  HR: 67 (27 Jan 2019 20:30) (61 - 86)  BP: 105/71 (27 Jan 2019 20:00) (92/64 - 118/76)  BP(mean): 83 (27 Jan 2019 20:00) (74 - 85)  RR: 25 (27 Jan 2019 20:30) (16 - 25)  SpO2: 97% (27 Jan 2019 20:30) (95% - 99%)  I&O's Summary    26 Jan 2019 07:01  -  27 Jan 2019 07:00  --------------------------------------------------------  IN: 240 mL / OUT: 0 mL / NET: 240 mL    27 Jan 2019 07:01  -  27 Jan 2019 20:51  --------------------------------------------------------  IN: 266 mL / OUT: 0 mL / NET: 266 mL        Appearance: Normal	  HEENT:   Normal oral mucosa, PERRL, EOMI	  Lymphatic: No lymphadenopathy  Cardiovascular: Normal S1 S2, No JVD, No murmurs, No edema  Respiratory: Lungs clear to auscultation	  Psychiatry: A & O x 3, Mood & affect appropriate  Gastrointestinal:  Soft, Non-tender, + BS	  Skin: No rashes, No ecchymoses, No cyanosis	  Neurologic: Non-focal  Extremities: Normal range of motion, No clubbing, cyanosis or edema  Vascular: Peripheral pulses palpable 2+ bilaterally    LABS:	 	                        13.5   6.3   )-----------( 187      ( 27 Jan 2019 06:41 )             38.0     Auto Eosinophil # x     / Auto Eosinophil % x     / Auto Neutrophil # x     / Auto Neutrophil % x     / BANDS % x                            14.7   8.8   )-----------( 215      ( 26 Jan 2019 20:57 )             41.6     Auto Eosinophil # x     / Auto Eosinophil % x     / Auto Neutrophil # x     / Auto Neutrophil % x     / BANDS % x        INR: 0.97 ratio (01-26 @ 20:57)    01-27    138  |  104  |  14  ----------------------------<  90  4.0   |  23  |  1.01  01-27    139  |  103  |  13  ----------------------------<  99  3.6   |  24  |  0.95  01-26    139  |  102  |  11  ----------------------------<  106<H>  4.0   |  25  |  0.92    Ca    8.7      27 Jan 2019 16:47  Mg     1.8     01-27  Phos  4.2     01-27  TPro  7.1  /  Alb  4.2  /  TBili  0.4  /  DBili  x   /  AST  16  /  ALT  36  /  AlkPhos  73  01-26        proBNP:   Lipid Profile:   HgA1c:   TSH:     CARDIAC MARKERS:   27 Jan 2019 16:47 Troponin x     / Creatine Kinase 156 U/L /  CKMB 4.2 ng/mL / CPK Mass Assay % 2.7 %   27 Jan 2019 12:39 Troponin x     / Creatine Kinase 152 U/L /  CKMB 5.1 ng/mL / CPK Mass Assay % 3.4 %        TELEMETRY: SR   ECG:  	SR   RADIOLOGY:  OTHER: 	    PREVIOUS DIAGNOSTIC TESTING:    [ ] Echocardiogram: pending   [ ]  Catheterization: pending   	  	  VENESSA Petit  Contact #	[phone]

## 2019-01-27 NOTE — CHART NOTE - NSCHARTNOTEFT_GEN_A_CORE
Medicine PA Note     TRISTEN BARR  MRN-[id number]    49M with CAD s/p 5 stents (FITO x1 1st OM, x3 mCx 2014; FITO x1 pLAD 2015), HTN, ADHD, depression/anxiety     Notified by RN for Chest Pain, patient seen and examined at bedside, appearing anxious, states he is having 4/10 CP left side of chest, that has been constant since he came to the ED. Patient states pain was worse down in ED. Patient has no associated symptoms. Denies SOB/Headache/n/v/d.       Vital Signs Last 24 Hrs  T(C): 36.5 (01-27-19 @ 01:38), Max: 36.6 (01-26-19 @ 20:51)  T(F): 97.7 (01-27-19 @ 01:38), Max: 97.9 (01-26-19 @ 20:51)  HR: 67 (01-27-19 @ 01:38) (67 - 86)  BP: 114/67 (01-27-19 @ 01:38) (104/71 - 118/76)  BP(mean): --  RR: 16 (01-27-19 @ 01:38) (16 - 20)  SpO2: 96% (01-27-19 @ 01:38) (95% - 98%)  Daily Height in cm: 182.88 (27 Jan 2019 01:38)    Daily   I&O's Summary    CAPILLARY BLOOD GLUCOSE                          14.7   8.8   )-----------( 215      ( 26 Jan 2019 20:57 )             41.6     01-26    139  |  102  |  11  ----------------------------<  106<H>  4.0   |  25  |  0.92    Ca    8.9      26 Jan 2019 20:57    TPro  7.1  /  Alb  4.2  /  TBili  0.4  /  DBili  x   /  AST  16  /  ALT  36  /  AlkPhos  73  01-26    PT/INR - ( 26 Jan 2019 20:57 )   PT: 11.1 sec;   INR: 0.97 ratio         PTT - ( 26 Jan 2019 20:57 )  PTT:28.0 sec      PHYSICAL EXAM:  GENERAL: NAD,   CHEST/LUNG: Clear to auscultation bilaterally; No wheeze  HEART: Regular rate and rhythm; No murmurs, rubs, or gallops  ABDOMEN: Soft, Nontender, Nondistended; Bowel sounds present  EXTREMITIES:  2+ Peripheral Pulses, No clubbing, cyanosis, or edema  PSYCH: AAOx3    Assessment/Plan: Chest Pain   - EKG x 1 stat, showing NSR, HR 63BPM, stable with no changes from EKG done in ED   - CE x 1 neg, awaiting short interval f/u troponin will trend if elevated   - cardiology called by ED, awaiting recommendations   - per attending note, nitroglycerin, prn for chest pain.   - will consider morphine x 1 dose if pain unimproved   - will continue to monitor   - will endorse to AM team   - attending to follow       Tushar Hutton PA-C,   Department of Medicine Medicine PA Note     TRISTEN BARR  MRN-[id number]    49M with CAD s/p 5 stents (FITO x1 1st OM, x3 mCx 2014; FITO x1 pLAD 2015), HTN, ADHD, depression/anxiety     Notified by RN for Chest Pain, patient seen and examined at bedside, appearing anxious, states he is having 4/10 CP left side of chest, that has been constant since he came to the ED. Patient states pain was worse down in ED. Patient has no associated symptoms. Denies SOB/Headache/n/v/d.       Vital Signs Last 24 Hrs  T(C): 36.5 (01-27-19 @ 01:38), Max: 36.6 (01-26-19 @ 20:51)  T(F): 97.7 (01-27-19 @ 01:38), Max: 97.9 (01-26-19 @ 20:51)  HR: 67 (01-27-19 @ 01:38) (67 - 86)  BP: 114/67 (01-27-19 @ 01:38) (104/71 - 118/76)  BP(mean): --  RR: 16 (01-27-19 @ 01:38) (16 - 20)  SpO2: 96% (01-27-19 @ 01:38) (95% - 98%)  Daily Height in cm: 182.88 (27 Jan 2019 01:38)    Daily   I&O's Summary    CAPILLARY BLOOD GLUCOSE                          14.7   8.8   )-----------( 215      ( 26 Jan 2019 20:57 )             41.6     01-26    139  |  102  |  11  ----------------------------<  106<H>  4.0   |  25  |  0.92    Ca    8.9      26 Jan 2019 20:57    TPro  7.1  /  Alb  4.2  /  TBili  0.4  /  DBili  x   /  AST  16  /  ALT  36  /  AlkPhos  73  01-26    PT/INR - ( 26 Jan 2019 20:57 )   PT: 11.1 sec;   INR: 0.97 ratio         PTT - ( 26 Jan 2019 20:57 )  PTT:28.0 sec      PHYSICAL EXAM:  GENERAL: NAD,   CHEST/LUNG: Clear to auscultation bilaterally; No wheeze  HEART: Regular rate and rhythm; No murmurs, rubs, or gallops  ABDOMEN: Soft, Nontender, Nondistended; Bowel sounds present  EXTREMITIES:  2+ Peripheral Pulses, No clubbing, cyanosis, or edema  PSYCH: AAOx3    Assessment/Plan: Chest Pain   - EKG x 1 stat, showing NSR, HR 63BPM, stable with no changes from EKG done in ED   - CE x 1 neg, awaiting short interval f/u troponin will trend if elevated   - cardiology called by ED, awaiting recommendations   - per attending note, nitroglycerin, prn for chest pain. -- > s/p with improvement in pain.   - will consider morphine x 1 dose if pain unimproved   - will continue to monitor   - will endorse to AM team   - attending to follow       Tushar Hutton PA-C,   Department of Medicine Medicine PA Note     TRISTEN BARR  MRN-[id number]    49M with CAD s/p 5 stents (FITO x1 1st OM, x3 mCx 2014; FITO x1 pLAD 2015), HTN, ADHD, depression/anxiety     Notified by RN for Chest Pain, patient seen and examined at bedside, appearing anxious, states he is having 4/10 CP left side of chest, that has been constant since he came to the ED. Patient states pain was worse down in ED. Patient has no associated symptoms. Denies SOB/Headache/n/v/d.       Vital Signs Last 24 Hrs  T(C): 36.5 (01-27-19 @ 01:38), Max: 36.6 (01-26-19 @ 20:51)  T(F): 97.7 (01-27-19 @ 01:38), Max: 97.9 (01-26-19 @ 20:51)  HR: 67 (01-27-19 @ 01:38) (67 - 86)  BP: 114/67 (01-27-19 @ 01:38) (104/71 - 118/76)  BP(mean): --  RR: 16 (01-27-19 @ 01:38) (16 - 20)  SpO2: 96% (01-27-19 @ 01:38) (95% - 98%)  Daily Height in cm: 182.88 (27 Jan 2019 01:38)    Daily   I&O's Summary    CAPILLARY BLOOD GLUCOSE                          14.7   8.8   )-----------( 215      ( 26 Jan 2019 20:57 )             41.6     01-26    139  |  102  |  11  ----------------------------<  106<H>  4.0   |  25  |  0.92    Ca    8.9      26 Jan 2019 20:57    TPro  7.1  /  Alb  4.2  /  TBili  0.4  /  DBili  x   /  AST  16  /  ALT  36  /  AlkPhos  73  01-26    PT/INR - ( 26 Jan 2019 20:57 )   PT: 11.1 sec;   INR: 0.97 ratio         PTT - ( 26 Jan 2019 20:57 )  PTT:28.0 sec      PHYSICAL EXAM:  GENERAL: NAD,   CHEST/LUNG: Clear to auscultation bilaterally; No wheeze  HEART: Regular rate and rhythm; No murmurs, rubs, or gallops  ABDOMEN: Soft, Nontender, Nondistended; Bowel sounds present  EXTREMITIES:  2+ Peripheral Pulses, No clubbing, cyanosis, or edema  PSYCH: AAOx3    Assessment/Plan: Chest Pain   - EKG x 1 stat, showing NSR, HR 63BPM, stable with no changes from EKG done in ED   - CE x 1 neg, awaiting short interval f/u troponin will trend if elevated   - cardiology called by attending, awaiting recommendations   - per attending note, nitroglycerin, prn for chest pain. -- > s/p with improvement in pain.   - will consider morphine x 1 dose if pain unimproved   - will continue to monitor   - will endorse to AM team   - attending to follow       Tushar Hutton PA-C,   Department of Medicine Medicine PA Note     TRISTEN BARR  MRN-[id number]    49M with CAD s/p 5 stents (FITO x1 1st OM, x3 mCx 2014; FITO x1 pLAD 2015), HTN, ADHD, depression/anxiety     Notified by RN for Chest Pain, patient seen and examined at bedside, appearing anxious, states he is having 4/10 CP left side of chest, that has been constant since he came to the ED. Patient states pain was worse down in ED. Patient has no associated symptoms. Denies SOB/Headache/n/v/d.       Vital Signs Last 24 Hrs  T(C): 36.5 (01-27-19 @ 01:38), Max: 36.6 (01-26-19 @ 20:51)  T(F): 97.7 (01-27-19 @ 01:38), Max: 97.9 (01-26-19 @ 20:51)  HR: 67 (01-27-19 @ 01:38) (67 - 86)  BP: 114/67 (01-27-19 @ 01:38) (104/71 - 118/76)  BP(mean): --  RR: 16 (01-27-19 @ 01:38) (16 - 20)  SpO2: 96% (01-27-19 @ 01:38) (95% - 98%)  Daily Height in cm: 182.88 (27 Jan 2019 01:38)    Daily   I&O's Summary    CAPILLARY BLOOD GLUCOSE                          14.7   8.8   )-----------( 215      ( 26 Jan 2019 20:57 )             41.6     01-26    139  |  102  |  11  ----------------------------<  106<H>  4.0   |  25  |  0.92    Ca    8.9      26 Jan 2019 20:57    TPro  7.1  /  Alb  4.2  /  TBili  0.4  /  DBili  x   /  AST  16  /  ALT  36  /  AlkPhos  73  01-26    PT/INR - ( 26 Jan 2019 20:57 )   PT: 11.1 sec;   INR: 0.97 ratio         PTT - ( 26 Jan 2019 20:57 )  PTT:28.0 sec      PHYSICAL EXAM:  GENERAL: NAD,   CHEST/LUNG: Clear to auscultation bilaterally; No wheeze  HEART: Regular rate and rhythm; No murmurs, rubs, or gallops  ABDOMEN: Soft, Nontender, Nondistended; Bowel sounds present  EXTREMITIES:  2+ Peripheral Pulses, No clubbing, cyanosis, or edema  PSYCH: AAOx3    Assessment/Plan: Chest Pain   - EKG x 1 stat, showing NSR, HR 63BPM, stable with no changes from EKG done in ED   - CE x 1 neg, awaiting short interval f/u troponin will trend if elevated --> trop >6 to 17, repeat sent for AM.   - cardiology called by attending, awaiting recommendations --> patient with outside cardiologist, Dr. Jcaob Robledo, called answering service, paged  awaiting call back   - per attending note, nitroglycerin, prn for chest pain. -- > s/p with improvement in pain.   - will consider morphine x 1 dose if pain unimproved   - will continue to monitor   - will endorse to AM team   - attending to follow       Tushar Hutton PA-C,   Department of Medicine Medicine PA Note     TRISTEN BARR  MRN-[id number]    49M with CAD s/p 5 stents (FITO x1 1st OM, x3 mCx 2014; FITO x1 pLAD 2015), HTN, ADHD, depression/anxiety     Notified by RN for Chest Pain, patient seen and examined at bedside, appearing anxious, states he is having 4/10 CP left side of chest, that has been constant since he came to the ED. Patient states pain was worse down in ED. Patient has no associated symptoms. Denies SOB/Headache/n/v/d.       Vital Signs Last 24 Hrs  T(C): 36.5 (01-27-19 @ 01:38), Max: 36.6 (01-26-19 @ 20:51)  T(F): 97.7 (01-27-19 @ 01:38), Max: 97.9 (01-26-19 @ 20:51)  HR: 67 (01-27-19 @ 01:38) (67 - 86)  BP: 114/67 (01-27-19 @ 01:38) (104/71 - 118/76)  BP(mean): --  RR: 16 (01-27-19 @ 01:38) (16 - 20)  SpO2: 96% (01-27-19 @ 01:38) (95% - 98%)  Daily Height in cm: 182.88 (27 Jan 2019 01:38)    Daily   I&O's Summary    CAPILLARY BLOOD GLUCOSE                          14.7   8.8   )-----------( 215      ( 26 Jan 2019 20:57 )             41.6     01-26    139  |  102  |  11  ----------------------------<  106<H>  4.0   |  25  |  0.92    Ca    8.9      26 Jan 2019 20:57    TPro  7.1  /  Alb  4.2  /  TBili  0.4  /  DBili  x   /  AST  16  /  ALT  36  /  AlkPhos  73  01-26    PT/INR - ( 26 Jan 2019 20:57 )   PT: 11.1 sec;   INR: 0.97 ratio         PTT - ( 26 Jan 2019 20:57 )  PTT:28.0 sec      PHYSICAL EXAM:  GENERAL: NAD,   CHEST/LUNG: Clear to auscultation bilaterally; No wheeze  HEART: Regular rate and rhythm; No murmurs, rubs, or gallops  ABDOMEN: Soft, Nontender, Nondistended; Bowel sounds present  EXTREMITIES:  2+ Peripheral Pulses, No clubbing, cyanosis, or edema  PSYCH: AAOx3    Assessment/Plan: Chest Pain   - EKG x 1 stat, showing NSR, HR 63BPM, stable with no changes from EKG done in ED   - CE x 1 neg, awaiting short interval f/u troponin will trend if elevated --> trop >6 to 17, repeat sent for AM.   - cardiology called by attending, awaiting recommendations --> patient with outside cardiologist, Dr. Jacob Robledo, called answering service, paged  awaiting call back --> d/w on call service line, does not come to [facility].  - per attending note, nitroglycerin, prn for chest pain. -- > s/p with improvement in pain.   - will consider morphine x 1 dose if pain unimproved   - will continue to monitor   - will endorse to AM team   - attending to follow       Addendum: patient still with active chest pain, d/w house cardiology Dr. Harrison, per her instruction, isosorbide 30mg stat now then daily, Brilinta load, and therapeutic Lovenox to be started now.       Tushar Hutton PA-C,   Department of Medicine Medicine PA Note     TRISTEN BARR  MRN-[id number]    49M with CAD s/p 5 stents (FITO x1 1st OM, x3 mCx 2014; FITO x1 pLAD 2015), HTN, ADHD, depression/anxiety     Notified by RN for Chest Pain, patient seen and examined at bedside, appearing anxious, states he is having 4/10 CP left side of chest, that has been constant since he came to the ED. Patient states pain was worse down in ED. Patient has no associated symptoms. Denies SOB/Headache/n/v/d.       Vital Signs Last 24 Hrs  T(C): 36.5 (01-27-19 @ 01:38), Max: 36.6 (01-26-19 @ 20:51)  T(F): 97.7 (01-27-19 @ 01:38), Max: 97.9 (01-26-19 @ 20:51)  HR: 67 (01-27-19 @ 01:38) (67 - 86)  BP: 114/67 (01-27-19 @ 01:38) (104/71 - 118/76)  BP(mean): --  RR: 16 (01-27-19 @ 01:38) (16 - 20)  SpO2: 96% (01-27-19 @ 01:38) (95% - 98%)  Daily Height in cm: 182.88 (27 Jan 2019 01:38)    Daily   I&O's Summary    CAPILLARY BLOOD GLUCOSE                          14.7   8.8   )-----------( 215      ( 26 Jan 2019 20:57 )             41.6     01-26    139  |  102  |  11  ----------------------------<  106<H>  4.0   |  25  |  0.92    Ca    8.9      26 Jan 2019 20:57    TPro  7.1  /  Alb  4.2  /  TBili  0.4  /  DBili  x   /  AST  16  /  ALT  36  /  AlkPhos  73  01-26    PT/INR - ( 26 Jan 2019 20:57 )   PT: 11.1 sec;   INR: 0.97 ratio         PTT - ( 26 Jan 2019 20:57 )  PTT:28.0 sec      PHYSICAL EXAM:  GENERAL: NAD,   CHEST/LUNG: Clear to auscultation bilaterally; No wheeze  HEART: Regular rate and rhythm; No murmurs, rubs, or gallops  ABDOMEN: Soft, Nontender, Nondistended; Bowel sounds present  EXTREMITIES:  2+ Peripheral Pulses, No clubbing, cyanosis, or edema  PSYCH: AAOx3    Assessment/Plan: Chest Pain   - EKG x 1 stat, showing NSR, HR 63BPM, stable with no changes from EKG done in ED   - CE x 1 neg, awaiting short interval f/u troponin will trend if elevated --> trop >6 to 17, repeat sent for AM.   - cardiology called by attending, awaiting recommendations --> patient with outside cardiologist, Dr. Jacob Robledo, called answering service, paged  awaiting call back --> d/w on call service line, does not come to [facility]. --> house cardiology to follow per attending   - per attending note, nitroglycerin, prn for chest pain. -- > s/p with improvement in pain.   - will consider morphine x 1 dose if pain unimproved   - will continue to monitor   - will endorse to AM team   - attending to follow       Addendum: patient still with active chest pain, d/w house cardiology Dr. Harrison, per her instruction, isosorbide 30mg stat now then daily, Brilinta load, and therapeutic Lovenox to be started now.       Tushar Hutton PA-C,   Department of Medicine Medicine PA Note     TRISTEN BARR  MRN-[id number]    49M with CAD s/p 5 stents (FITO x1 1st OM, x3 mCx 2014; FITO x1 pLAD 2015), HTN, ADHD, depression/anxiety     Notified by RN for Chest Pain, patient seen and examined at bedside, appearing anxious, states he is having 4/10 CP left side of chest, that has been constant since he came to the ED. Patient states pain was worse down in ED. Patient has no associated symptoms. Denies SOB/Headache/n/v/d.       Vital Signs Last 24 Hrs  T(C): 36.5 (01-27-19 @ 01:38), Max: 36.6 (01-26-19 @ 20:51)  T(F): 97.7 (01-27-19 @ 01:38), Max: 97.9 (01-26-19 @ 20:51)  HR: 67 (01-27-19 @ 01:38) (67 - 86)  BP: 114/67 (01-27-19 @ 01:38) (104/71 - 118/76)  BP(mean): --  RR: 16 (01-27-19 @ 01:38) (16 - 20)  SpO2: 96% (01-27-19 @ 01:38) (95% - 98%)  Daily Height in cm: 182.88 (27 Jan 2019 01:38)    Daily   I&O's Summary    CAPILLARY BLOOD GLUCOSE                          14.7   8.8   )-----------( 215      ( 26 Jan 2019 20:57 )             41.6     01-26    139  |  102  |  11  ----------------------------<  106<H>  4.0   |  25  |  0.92    Ca    8.9      26 Jan 2019 20:57    TPro  7.1  /  Alb  4.2  /  TBili  0.4  /  DBili  x   /  AST  16  /  ALT  36  /  AlkPhos  73  01-26    PT/INR - ( 26 Jan 2019 20:57 )   PT: 11.1 sec;   INR: 0.97 ratio         PTT - ( 26 Jan 2019 20:57 )  PTT:28.0 sec      PHYSICAL EXAM:  GENERAL: NAD,   CHEST/LUNG: Clear to auscultation bilaterally; No wheeze  HEART: Regular rate and rhythm; No murmurs, rubs, or gallops  ABDOMEN: Soft, Nontender, Nondistended; Bowel sounds present  EXTREMITIES:  2+ Peripheral Pulses, No clubbing, cyanosis, or edema  PSYCH: AAOx3    Assessment/Plan: Chest Pain   - EKG x 1 stat, showing NSR, HR 63BPM, stable with no changes from EKG done in ED   - CE x 1 neg, awaiting short interval f/u troponin will trend if elevated --> trop >6 to 17, repeat sent for AM.   - cardiology called by attending, awaiting recommendations --> patient with outside cardiologist, Dr. Jacob Robledo, called answering service, paged  awaiting call back --> d/w on call service line, does not come to [facility]. --> house cardiology to follow per attending   - per attending note, nitroglycerin, prn for chest pain. -- > s/p with improvement in pain.   - will consider morphine x 1 dose if pain unimproved   - will continue to monitor   - will endorse to AM team   - attending to follow       Addendum: patient still with active chest pain, d/w house cardiology Dr. Harrison, per her instruction, isosorbide 30mg stat now then daily, Brilinta load, and therapeutic Lovenox to be started now. -- > improvement in pain with f/u assessment.       Tushar Hutton PA-C,   Department of Medicine

## 2019-01-27 NOTE — CONSULT NOTE ADULT - SUBJECTIVE AND OBJECTIVE BOX
CARDIOLOGY CONSULTATION NOTE                                                                                             Consult requested by:  Dr. Mcmahon    Reason for Consultation: chest pain    History obtained by: Patient and medical record     obtained: No    Chief complaint:    Patient is a 49y old  Male who presents with a chief complaint of chest pain (26 Jan 2019 23:50)      HPI:  49M with CAD s/p 5 stents (FITO x1 1st OM, x3 mCx 2014; FITO x1 pLAD 2015), HTN, ADHD, depression/anxiety p/w chest pain. Pt states around 6PM on evening of arrival started feeling nauseous, lightheaded and flushed while driving to [facility]. On arrival home around 7PM, started having acute onset chest pain, substernal, 9/10, squeezing in nature, radiating to L shoulder and arm, with numbness/tingling in fingertips, no palpitations, no SOB, no vomiting. Pain was relieved with sublingual NTG.  States pain is similar to CP he's had in the past when he first underwent angioplasty 2014. Prior to this episode, patient was not limited in his exercise tolerance or day to day activities.       REVIEW OF SYMPTOMS: Cardiovascular:  See HPI. + chest pain,  No dyspnea,  No syncope,  No palpitations, No dizziness, No Orthopnea,      No Paroxsymal nocturnal dyspnea;  Respiratory:  No Dyspnea, No cough,     Genitourinary:  No dysuria, no hematuria; Gastrointestinal:  No nausea, no vomiting. No diarrhea.  No abdominal pain. No dark color stool, no melena ; Neurological: No headache, no dizziness, no slurred speech;  Psychiatric: No agitation, no anxiety.  ALL OTHER REVIEW OF SYSTEMS ARE NEGATIVE.    ALLERGIES:   No Known Allergies      CURRENT MEDICATIONS:  isosorbide   mononitrate ER Tablet (IMDUR) 30 milliGRAM(s) Oral daily  losartan 25 milliGRAM(s) Oral daily  metoprolol succinate ER 25 milliGRAM(s) Oral daily     escitalopram  methylphenidate  traZODone  metoclopramide  pantoprazole    Tablet  aspirin enteric coated  atorvastatin  enoxaparin Injectable      HOME MEDICATIONS:  as per med red      PAST MEDICAL HISTORY:  Hypertension  Anxiety  Depression  Traumatic brain injury  Hyperlipidemia      PAST SURGICAL HISTORY:  S/P coronary artery stent placement      FAMILY HISTORY:  Family history of colon cancer (Mother)      SOCIAL HISTORY:  quit smoking 2014        Vital Signs Last 24 Hrs  T(C): 36.4 (27 Jan 2019 04:24), Max: 36.6 (26 Jan 2019 20:51)  T(F): 97.6 (27 Jan 2019 04:24), Max: 97.9 (26 Jan 2019 20:51)  HR: 61 (27 Jan 2019 04:24) (61 - 86)  BP: 106/69 (27 Jan 2019 04:24) (104/68 - 118/76)  BP(mean): --  RR: 18 (27 Jan 2019 04:24) (16 - 20)  SpO2: 97% (27 Jan 2019 04:24) (95% - 98%)      PHYSICAL EXAM:  Constitutional: Comfortable . No acute distress.   HEENT: Atraumatic and normcephalic , neck is supple . no JVD. No carotid bruit. PEERL   CNS: A&Ox3. No focal deficits. EOMI.   Lymph Nodes: Cervical : Not palpable.  Respiratory: CTAB  Cardiovascular: S1S2 RRR. No murmur/rubs or gallop.  Gastrointestinal: Soft non-tender and non distended . +Bowel sounds.   Extremities: No edema.   Psychiatric: Calm . no agitation.  Skin: No skin rash/ulcers visualized to face, hands or feet.    Intake and output:     LABS:                        14.7   8.8   )-----------( 215      ( 26 Jan 2019 20:57 )             41.6     01-26    139  |  102  |  11  ----------------------------<  106<H>  4.0   |  25  |  0.92    Ca    8.9      26 Jan 2019 20:57    TPro  7.1  /  Alb  4.2  /  TBili  0.4  /  DBili  x   /  AST  16  /  ALT  36  /  AlkPhos  73  01-26      ;p-BNP=  PT/INR - ( 26 Jan 2019 20:57 )   PT: 11.1 sec;   INR: 0.97 ratio         PTT - ( 26 Jan 2019 20:57 )  PTT:28.0 sec      INTERPRETATION OF TELEMETRY: Reviewed by me.   ECG: Reviewed by me. sinus rhythm, no ST-T wave changes    RADIOLOGY & ADDITIONAL STUDIES:    X-ray:  reviewed by me. no vascular congestion  < from: TTE with Doppler (w/Cont) (07.13.14 @ 10:21) >  Conclusions:  1. Normal left ventricular internal dimensions and wall  thicknesses.  2. Endocardium not well visualized; grossly mild segmental  left ventricular dysfunction, hypokinesis of the inferior  and inferolateral walls.  Endocardial visualization  enhanced with intravenous injection of echo contrast  (Definity).    < end of copied text >  < from: Cardiac Cath Lab - Adult (07.20.15 @ 10:58) >  CORONARY VESSELS: The coronary circulation is right dominant.  LM:   --  LM: Normal.  LAD:   --  Proximal LAD: There was a 0 % stenosis at the site of a prior  stent.  CX:   --  Mid circumflex: There was a 0 % stenosis at the site of a prior  stent.  --  OM1: There was a 0 % stenosis at the site of a prior stent.  RCA:   --  Proximal RCA: There was a 30 % stenosis.  COMPLICATIONS: There were no complications.    < end of copied text >

## 2019-01-28 ENCOUNTER — TRANSCRIPTION ENCOUNTER (OUTPATIENT)
Age: 49
End: 2019-01-28

## 2019-01-28 VITALS
RESPIRATION RATE: 20 BRPM | HEART RATE: 66 BPM | DIASTOLIC BLOOD PRESSURE: 84 MMHG | SYSTOLIC BLOOD PRESSURE: 113 MMHG | OXYGEN SATURATION: 98 %

## 2019-01-28 DIAGNOSIS — I25.700 ATHEROSCLEROSIS OF CORONARY ARTERY BYPASS GRAFT(S), UNSPECIFIED, WITH UNSTABLE ANGINA PECTORIS: ICD-10-CM

## 2019-01-28 LAB
APTT BLD: 52.8 SEC — HIGH (ref 27.5–36.3)
BASOPHILS # BLD AUTO: 0 K/UL — SIGNIFICANT CHANGE UP (ref 0–0.2)
BASOPHILS NFR BLD AUTO: 0.7 % — SIGNIFICANT CHANGE UP (ref 0–2)
CK SERPL-CCNC: 119 U/L — SIGNIFICANT CHANGE UP (ref 30–200)
EOSINOPHIL # BLD AUTO: 0.2 K/UL — SIGNIFICANT CHANGE UP (ref 0–0.5)
EOSINOPHIL NFR BLD AUTO: 2.7 % — SIGNIFICANT CHANGE UP (ref 0–6)
HCT VFR BLD CALC: 39.5 % — SIGNIFICANT CHANGE UP (ref 39–50)
HGB BLD-MCNC: 14 G/DL — SIGNIFICANT CHANGE UP (ref 13–17)
INR BLD: 1.07 RATIO — SIGNIFICANT CHANGE UP (ref 0.88–1.16)
LYMPHOCYTES # BLD AUTO: 2.9 K/UL — SIGNIFICANT CHANGE UP (ref 1–3.3)
LYMPHOCYTES # BLD AUTO: 46.3 % — HIGH (ref 13–44)
MAGNESIUM SERPL-MCNC: 2.1 MG/DL — SIGNIFICANT CHANGE UP (ref 1.6–2.6)
MCHC RBC-ENTMCNC: 31.7 PG — SIGNIFICANT CHANGE UP (ref 27–34)
MCHC RBC-ENTMCNC: 35.5 GM/DL — SIGNIFICANT CHANGE UP (ref 32–36)
MCV RBC AUTO: 89.2 FL — SIGNIFICANT CHANGE UP (ref 80–100)
MONOCYTES # BLD AUTO: 0.6 K/UL — SIGNIFICANT CHANGE UP (ref 0–0.9)
MONOCYTES NFR BLD AUTO: 10.4 % — SIGNIFICANT CHANGE UP (ref 2–14)
NEUTROPHILS # BLD AUTO: 2.5 K/UL — SIGNIFICANT CHANGE UP (ref 1.8–7.4)
NEUTROPHILS NFR BLD AUTO: 39.9 % — LOW (ref 43–77)
PHOSPHATE SERPL-MCNC: 3.8 MG/DL — SIGNIFICANT CHANGE UP (ref 2.5–4.5)
PLATELET # BLD AUTO: 175 K/UL — SIGNIFICANT CHANGE UP (ref 150–400)
PROTHROM AB SERPL-ACNC: 12.2 SEC — SIGNIFICANT CHANGE UP (ref 10–12.9)
RBC # BLD: 4.42 M/UL — SIGNIFICANT CHANGE UP (ref 4.2–5.8)
RBC # FLD: 11.6 % — SIGNIFICANT CHANGE UP (ref 10.3–14.5)
TROPONIN T, HIGH SENSITIVITY RESULT: 64 NG/L — HIGH (ref 0–51)
TSH SERPL-MCNC: 2.06 UIU/ML — SIGNIFICANT CHANGE UP (ref 0.27–4.2)
WBC # BLD: 6.2 K/UL — SIGNIFICANT CHANGE UP (ref 3.8–10.5)
WBC # FLD AUTO: 6.2 K/UL — SIGNIFICANT CHANGE UP (ref 3.8–10.5)

## 2019-01-28 PROCEDURE — 96375 TX/PRO/DX INJ NEW DRUG ADDON: CPT

## 2019-01-28 PROCEDURE — 80048 BASIC METABOLIC PNL TOTAL CA: CPT

## 2019-01-28 PROCEDURE — 84100 ASSAY OF PHOSPHORUS: CPT

## 2019-01-28 PROCEDURE — 82553 CREATINE MB FRACTION: CPT

## 2019-01-28 PROCEDURE — 99238 HOSP IP/OBS DSCHRG MGMT 30/<: CPT

## 2019-01-28 PROCEDURE — 96374 THER/PROPH/DIAG INJ IV PUSH: CPT

## 2019-01-28 PROCEDURE — 99285 EMERGENCY DEPT VISIT HI MDM: CPT | Mod: 25

## 2019-01-28 PROCEDURE — 93306 TTE W/DOPPLER COMPLETE: CPT

## 2019-01-28 PROCEDURE — C1894: CPT

## 2019-01-28 PROCEDURE — 85610 PROTHROMBIN TIME: CPT

## 2019-01-28 PROCEDURE — C1887: CPT

## 2019-01-28 PROCEDURE — 93306 TTE W/DOPPLER COMPLETE: CPT | Mod: 26

## 2019-01-28 PROCEDURE — 93005 ELECTROCARDIOGRAM TRACING: CPT

## 2019-01-28 PROCEDURE — 93010 ELECTROCARDIOGRAM REPORT: CPT

## 2019-01-28 PROCEDURE — 93458 L HRT ARTERY/VENTRICLE ANGIO: CPT

## 2019-01-28 PROCEDURE — C1769: CPT

## 2019-01-28 PROCEDURE — 71046 X-RAY EXAM CHEST 2 VIEWS: CPT

## 2019-01-28 PROCEDURE — 83735 ASSAY OF MAGNESIUM: CPT

## 2019-01-28 PROCEDURE — 85027 COMPLETE CBC AUTOMATED: CPT

## 2019-01-28 PROCEDURE — 84484 ASSAY OF TROPONIN QUANT: CPT

## 2019-01-28 PROCEDURE — 80053 COMPREHEN METABOLIC PANEL: CPT

## 2019-01-28 PROCEDURE — 82550 ASSAY OF CK (CPK): CPT

## 2019-01-28 PROCEDURE — 93458 L HRT ARTERY/VENTRICLE ANGIO: CPT | Mod: 26,GC

## 2019-01-28 PROCEDURE — 85730 THROMBOPLASTIN TIME PARTIAL: CPT

## 2019-01-28 PROCEDURE — 84443 ASSAY THYROID STIM HORMONE: CPT

## 2019-01-28 RX ADMIN — HEPARIN SODIUM 1200 UNIT(S)/HR: 5000 INJECTION INTRAVENOUS; SUBCUTANEOUS at 00:22

## 2019-01-28 RX ADMIN — TICAGRELOR 90 MILLIGRAM(S): 90 TABLET ORAL at 06:02

## 2019-01-28 RX ADMIN — ESCITALOPRAM OXALATE 20 MILLIGRAM(S): 10 TABLET, FILM COATED ORAL at 11:55

## 2019-01-28 RX ADMIN — Medication 5 MILLIGRAM(S): at 11:55

## 2019-01-28 RX ADMIN — Medication 5 MILLIGRAM(S): at 06:02

## 2019-01-28 RX ADMIN — Medication 81 MILLIGRAM(S): at 08:52

## 2019-01-28 RX ADMIN — PANTOPRAZOLE SODIUM 40 MILLIGRAM(S): 20 TABLET, DELAYED RELEASE ORAL at 06:02

## 2019-01-28 RX ADMIN — HEPARIN SODIUM 1200 UNIT(S)/HR: 5000 INJECTION INTRAVENOUS; SUBCUTANEOUS at 07:07

## 2019-01-28 NOTE — DIETITIAN INITIAL EVALUATION ADULT. - ADHERENCE
fair/Pt reports consuming 1 meal per day and tries to limit salt consumption at home. Reports taking Multivitamin, Magnesium, and Fish Oil PTA.

## 2019-01-28 NOTE — DIETITIAN INITIAL EVALUATION ADULT. - NS FNS WEIGHT CHANGE REASON
Pt reports weight gain from 225 to 250 pounds "over the years", unable to recall reason or time frame. Noted weight as per previous RD note (07/20/2015) 225 pounds. Weight as per flow sheets (01/27) 253.5 pounds - consistent with pt's stated history; pt states would like to lose weight.

## 2019-01-28 NOTE — DISCHARGE NOTE ADULT - CARE PROVIDER_API CALL
Jacob Robledo), Cardiovascular Disease; Internal Medicine  [address] [place] [address]  Phone: [phone]  Fax: [phone]

## 2019-01-28 NOTE — PROGRESS NOTE ADULT - PROBLEM SELECTOR PLAN 1
- acute on set SS CP rad to arms, in setting of hx of extensive CAD, HTN and former smoker  - EKG without ischemic changes on admission.  - Repeat EKG and cardiac enzymes to be done now.  - Previous troponins slightly increasing.  - If EKG changes or troponin elevation, may need expedited cardiac intervention.   s/p ASA load - c/w ASA 81mg and high dose statin for now  c/w BB and ARB   monitor on tele   check TTE   cardiology following  nitro prn for chest pain if BP allows
- acute on set SS CP rad to arms, in setting of hx of extensive CAD, HTN and former smoker  -keep in CCU2 for further management,   -LHC in cath  -on Tridil gtt   -EKG daily and with episodes of chest pain   - Previous troponins slightly increasing.  - on Brilinta and heparin gtt   monitor on tele   check TTE
Received ASA Brilinta load on Heparin gtt. Nitro gtt for CP now weaned off  Continue ASA, Brilinta and Lipitor  Plan for Cath with Dr. Navarro  Start Metoprolol 12.5mg BID  Consider restarting Losartan post Cath

## 2019-01-28 NOTE — DIETITIAN INITIAL EVALUATION ADULT. - OTHER INFO
Pt seen for ICU length of stay initial assessment. Pt reports good appetite and PO intake. Noted 100% as per flow sheets. Denies difficulty chewing/swallowing. Pt denies nausea, vomiting, diarrhea, or constipation, reports last BM yesterday (01/27).

## 2019-01-28 NOTE — DISCHARGE NOTE ADULT - NS AS ACTIVITY OBS
Walking-Outdoors allowed/No Heavy lifting/straining/Walking-Indoors allowed/Stairs allowed/Showering allowed

## 2019-01-28 NOTE — DIETITIAN INITIAL EVALUATION ADULT. - NS AS NUTRI INTERV MEALS SNACK
Recommend continue DASH/TLC diet. Encourage PO intake, obtain food preferences, provide feeding assistance as needed.

## 2019-01-28 NOTE — PROGRESS NOTE ADULT - PROBLEM SELECTOR PLAN 2
CAD s/p 5 stents in past   last cath 7/2015 showing patents stents and no disease   c/w  DAPT, statin and Tridil for ACS
CAD s/p 5 stents in past   last cath 7/2015 showing patents stents and no disease   c/w ASA 81mg, lipitor 80mg, toprol 25mg and losartan 25mg
Received ASA Brilinta load on Heparin gtt. Nitro gtt for CP now weaned off  Continue ASA, Brilinta and Lipitor  Plan for Cath with Dr. Navarro  Start Metoprolol 12.5mg BID

## 2019-01-28 NOTE — DISCHARGE NOTE ADULT - HOSPITAL COURSE
This is a 49 year old man with past medical history of CAD/MI s/p 5 stents (FITO x1 1st OM, x3 mCx 2014; FITO x1 pLAD 2015), HTN, ADHD, depression/anxiety p/w chest pain, ECG shows no ST changes, original Armond negative admitted to tele.  Accepted to CCU after worsening chest pain on 3dsu , placed on Tridil gtt, cardiac biomarkers positive.  Chest pain resolved with nitro and tridil gtt.  Tridil weaned to off.  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  Patient will follow up with his gastroenterologist and cardiologist as outpatient.

## 2019-01-28 NOTE — DIETITIAN INITIAL EVALUATION ADULT. - NS AS NUTRI INTERV ED CONTENT
Provided education on heart healthy nutrition therapy. Recommended limited salt intake. Reviewed foods high in salt and amount of salt recommended per day. Discussed nutrition label reading. Stressed the importance of limited fried foods and saturated fat consumption. Recommended to consume 2-3 balanced meals per day, provided recommendations to help with gradual weight loss per pt' request. Pt amenable for education. RD remains available. Provided education on heart healthy nutrition therapy. Recommended limited salt intake. Reviewed foods high in salt and amount of salt recommended per day. Discussed nutrition label reading. Stressed the importance of limited fried foods and saturated fat consumption. Recommended to consume 2-3 balanced meals per day, provided recommendations to help with gradual weight loss per pt' request. Pt amenable for education and with good understanding of information provided. RD remains available.

## 2019-01-28 NOTE — DISCHARGE NOTE ADULT - ADDITIONAL INSTRUCTIONS
Please follow up with your cardiologist and gastroenterologist within 10-14 days after discharge from hospital.

## 2019-01-28 NOTE — PROGRESS NOTE ADULT - ATTENDING COMMENTS
Patient is seen and examined with fellow, NP and the CCU house-staff. I agree with the history, physical and the assessment and plan.  plan for cardiac cath for the unstable angina  on DAPT and hep gtt

## 2019-01-28 NOTE — DIETITIAN INITIAL EVALUATION ADULT. - ENERGY NEEDS
Ht: 72 inches Wt: 253.5 pounds BMI: 24.3 kg/m2 IBW: 178 (+/-10%) 142.1 %IBW  Pertinent information: Pt 48 y/o M with PMH: CAD S/P 5 stents (2014 and 2015), HTN, ADHD, depression/anxiety, HLD, Napoleon esophagus, admitted with chest pain, no changes in EKG, plan for cath today?.   No noted edema as per flow sheets. Skin: no noted pressure injuries as per documentation.

## 2019-01-28 NOTE — DISCHARGE NOTE ADULT - PLAN OF CARE
Pt remains chest pain free and understands post cath discharge instructions Continue your medications. Do not stop Aspirin or Plavix unless instructed by your cardiologist.  No heavy lifting or pushing/pulling or strenuous activity with procedure arm for 2 weeks. No driving for 2 days. No sex for 1 week.  You may shower 24 hours following procedure but no soaking of your wrist in water (such as in a pool, sink, or tub) for 1 week. Check wrist site for bleeding and/or swelling daily following procedure. Call your doctor/cardiologist immediately for bleeding or swelling or if you have increased/persistent pain or drainage at the wrist site or if you have numbness, tingling or blue or white coloring of your hand or fingers.  Follow up with your cardiologist in 1- 2 weeks. You may call [facility] at [phone] or [phone] after office hours and weekends with any questions or concerns following your procedure.

## 2019-01-28 NOTE — DISCHARGE NOTE ADULT - CARE PLAN
Principal Discharge DX:	Unstable angina due to arteriosclerosis of coronary artery bypass graft  Goal:	Pt remains chest pain free and understands post cath discharge instructions  Assessment and plan of treatment:	Continue your medications. Do not stop Aspirin or Plavix unless instructed by your cardiologist.  No heavy lifting or pushing/pulling or strenuous activity with procedure arm for 2 weeks. No driving for 2 days. No sex for 1 week.  You may shower 24 hours following procedure but no soaking of your wrist in water (such as in a pool, sink, or tub) for 1 week. Check wrist site for bleeding and/or swelling daily following procedure. Call your doctor/cardiologist immediately for bleeding or swelling or if you have increased/persistent pain or drainage at the wrist site or if you have numbness, tingling or blue or white coloring of your hand or fingers.  Follow up with your cardiologist in 1- 2 weeks. You may call [facility] at [phone] or [phone] after office hours and weekends with any questions or concerns following your procedure.

## 2019-01-28 NOTE — DIETITIAN INITIAL EVALUATION ADULT. - PROBLEM SELECTOR PLAN 4
pt on klonopin 0.5mg TID prn, trazadone 50 qh  iSTOP #[id number], klonopin dose verified   will c/w 0.5mg TID prn anxiety

## 2019-01-28 NOTE — DISCHARGE NOTE ADULT - PATIENT PORTAL LINK FT
You can access the [facility] Patient Portal, offered by [facility], by registering with the following website: [email or web address]

## 2019-01-28 NOTE — PROGRESS NOTE ADULT - PROBLEM SELECTOR PROBLEM 2
Coronary artery disease involving native coronary artery of native heart, angina presence unspecified
Coronary artery disease involving native coronary artery of native heart, angina presence unspecified
CAD (coronary artery disease)

## 2019-01-28 NOTE — PROGRESS NOTE ADULT - ASSESSMENT
49M with CAD s/p 5 stents (FITO x1 1st OM, x3 mCx 2014; FITO x1 pLAD 2015), HTN, ADHD, depression/anxiety p/w chest pain, admitted for further work-up.
49M with CAD s/p 5 stents (FITO x1 1st OM, x3 mCx 2014; FITO x1 pLAD 2015), HTN, ADHD, depression/anxiety p/w chest pain, admitted for further work-up.  Accepted to CCU after worsening chest pain on 3dsu , placed on Tridil gtt, cardiac biomarkers negative
This is a 49 year old man with past medical history of CAD/MI s/p 5 stents (FITO x1 1st OM, x3 mCx 2014; FITO x1 pLAD 2015), HTN, ADHD, depression/anxiety p/w chest pain, ECG shows no ST changes, original Armond negative admitted to tele.  Accepted to CCU after worsening chest pain on 3dsu , placed on Tridil gtt, cardiac biomarkers positive.  Chest pain resolved with nitro and tridil gtt.  Tridil weaned to off.  Plan for Cath today.

## 2019-01-28 NOTE — DISCHARGE NOTE ADULT - MEDICATION SUMMARY - MEDICATIONS TO TAKE
I will START or STAY ON the medications listed below when I get home from the hospital:    aspirin 81 mg oral delayed release tablet  -- 1 tab(s) by mouth once a day  -- Indication: For CAD (coronary artery disease)    losartan 25 mg oral tablet  -- 1 tab(s) by mouth once a day  -- Indication: For CAD (coronary artery disease)    clonazePAM 0.5 mg oral tablet  -- 1 tab(s) by mouth 3 times a day, As Needed for anxiety  -- Indication: For Anxiety    traZODone 50 mg oral tablet  -- orally once a day (at bedtime)  -- Indication: For Anxiety    escitalopram 20 mg oral tablet  -- 1 tab(s) by mouth once a day  -- Indication: For Anxiety    metoclopramide 5 mg oral tablet  -- 1 tab(s) by mouth 4 times a day (before meals and at bedtime)  -- Indication: For Mansfield esophagus    atorvastatin 80 mg oral tablet  -- 1 tab(s) by mouth once a day (at bedtime)  -- Indication: For CAD (coronary artery disease)    Lunesta 1 mg oral tablet  -- 1 tab(s) by mouth once a day (at bedtime)  -- Indication: For Anxiety    metoprolol succinate 25 mg oral tablet, extended release  -- 1 tab(s) by mouth once a day  -- Indication: For CAD (coronary artery disease)    methylphenidate 10 mg oral tablet  -- 1 tab(s) by mouth once a day  -- Indication: For ADHD    Protonix 40 mg oral delayed release tablet  -- orally 2 times a day  -- Indication: For Mansfield esophagus

## 2019-01-28 NOTE — PROGRESS NOTE ADULT - SUBJECTIVE AND OBJECTIVE BOX
CHIEF COMPLAINT::    INTERVAL HISTORY:    REVIEW OF SYSTEMS: all others negative    MEDICATIONS  (STANDING):  aspirin enteric coated 81 milliGRAM(s) Oral daily  atorvastatin 80 milliGRAM(s) Oral at bedtime  escitalopram 20 milliGRAM(s) Oral daily  heparin  Infusion.  Unit(s)/Hr (10 mL/Hr) IV Continuous <Continuous>  metoclopramide 5 milliGRAM(s) Oral Before meals and at bedtime  nitroglycerin  Infusion 10 MICROgram(s)/Min (3 mL/Hr) IV Continuous <Continuous>  pantoprazole    Tablet 40 milliGRAM(s) Oral before breakfast  ticagrelor 90 milliGRAM(s) Oral two times a day  traZODone 50 milliGRAM(s) Oral at bedtime    MEDICATIONS  (PRN):  acetaminophen   Tablet .. 650 milliGRAM(s) Oral every 6 hours PRN Mild Pain (1 - 3)  clonazePAM Tablet 0.5 milliGRAM(s) Oral three times a day PRN anxiety  heparin  Injectable 6000 Unit(s) IV Push every 6 hours PRN For aPTT less than 40  zolpidem 5 milliGRAM(s) Oral at bedtime PRN Insomnia      Objective:  Vital Signs Last 24 Hrs  T(C): 36.9 (2019 04:00), Max: 37 (2019 19:00)  T(F): 98.4 (2019 04:00), Max: 98.6 (2019 19:00)  HR: 67 (2019 06:00) (58 - 76)  BP: 124/83 (2019 06:00) (90/62 - 124/83)  BP(mean): 93 (2019 06:00) (67 - 94)  RR: 15 (2019 06:00) (9 - 25)  SpO2: 96% (2019 06:00) (93% - 99%)  ICU Vital Signs Last 24 Hrs  T(C): 36.9 (2019 04:00), Max: 37 (2019 19:00)  T(F): 98.4 (2019 04:00), Max: 98.6 (2019 19:00)  HR: 67 (2019 06:00) (58 - 76)  BP: 124/83 (2019 06:00) (90/62 - 124/83)  BP(mean): 93 (2019 06:00) (67 - 94)  ABP: --  ABP(mean): --  RR: 15 (2019 06:00) (9 - 25)  SpO2: 96% (2019 06:00) (93% - 99%)      Adult Advanced Hemodynamics Last 24 Hrs  CVP(mm Hg): --  CVP(cm H2O): --  CO: --  CI: --  PA: --  PA(mean): --  PCWP: --  SVR: --  SVRI: --  PVR: --  PVRI: --       @ 07:  -   @ 07:00  --------------------------------------------------------  IN: 240 mL / OUT: 0 mL / NET: 240 mL     @ 07: @ 06:54  --------------------------------------------------------  IN: 422 mL / OUT: 400 mL / NET: 22 mL      Daily     Daily Weight in k.5 (2019 16:45)    PHYSICAL EXAM:      Constitutional:    Eyes:    ENMT:    Neck:    Breasts:    Back:    Respiratory:    Cardiovascular:    Gastrointestinal:    Genitourinary:    Rectal:    Extremities:    Vascular:    Neurological:    Skin:    Lymph Nodes:    Musculoskeletal:    Psychiatric:        TELEMETRY:     EKG:     CARDIAC CATH:     ECHO:    IMAGIN.0   6.2   )-----------( 175      ( 2019 05:04 )             39.5         138  |  104  |  14  ----------------------------<  90  4.0   |  23  |  1.01    Ca    8.7      2019 16:47  Phos  3.8       Mg     2.1         TPro  7.1  /  Alb  4.2  /  TBili  0.4  /  DBili  x   /  AST  16  /  ALT  36  /  AlkPhos  73      LIVER FUNCTIONS - ( 2019 20:57 )  Alb: 4.2 g/dL / Pro: 7.1 g/dL / ALK PHOS: 73 U/L / ALT: 36 U/L / AST: 16 U/L / GGT: x           PT/INR - ( 2019 05:04 )   PT: 12.2 sec;   INR: 1.07 ratio         PTT - ( 2019 05:04 )  PTT:52.8 sec  Creatine Kinase, Serum: 119 U/L ( @ 05:04)  CKMB Units: 4.2 ng/mL ( @ 16:47)  Creatine Kinase, Serum: 156 U/L ( @ 16:47)  Creatine Kinase, Serum: 152 U/L ( @ 12:39)  CKMB Units: 5.1 ng/mL ( @ 12:39)      *BLOOD GAS/ARTERIAL/MIXED/VENOUS  *LACTATE      HEALTH ISSUES - PROBLEM Dx:  Prophylactic measure: Prophylactic measure  Hyperlipidemia: Hyperlipidemia  CAD (coronary artery disease): CAD (coronary artery disease)  NSTEMI (non-ST elevated myocardial infarction): NSTEMI (non-ST elevated myocardial infarction)  Need for prophylactic measure: Need for prophylactic measure  ADHD: ADHD  Insomnia: Insomnia  Mansfield esophagus: Mansfield esophagus  Depression: Depression  Anxiety: Anxiety  Essential hypertension: Essential hypertension  Coronary artery disease involving native coronary artery of native heart, angina presence unspecified: Coronary artery disease involving native coronary artery of native heart, angina presence unspecified  Chest pain: Chest pain        HEALTH ISSUES - R/O PROBLEM Dx:      TIGRE Chavez NP   # CHIEF COMPLAINT: Unstable Angina    INTERVAL HISTORY:  This is a 49 year old man with past medical history of CAD/MI s/p 5 stents (FITO x1 1st OM, x3 mCx 2014; FITO x1 pLAD ), HTN, ADHD, depression/anxiety p/w chest pain, ECG shows no ST changes, original Armond negative admitted to tele.  Accepted to CCU after worsening chest pain on 3dsu , placed on Tridil gtt, cardiac biomarkers positive.  Chest pain resolved with nitro and tridil gtt.  Tridil weaned to off.  Plan for Cath today.     REVIEW OF SYSTEMS: all others negative    MEDICATIONS  (STANDING):  aspirin enteric coated 81 milliGRAM(s) Oral daily  atorvastatin 80 milliGRAM(s) Oral at bedtime  escitalopram 20 milliGRAM(s) Oral daily  heparin  Infusion.  Unit(s)/Hr (10 mL/Hr) IV Continuous <Continuous>  metoclopramide 5 milliGRAM(s) Oral Before meals and at bedtime  nitroglycerin  Infusion 10 MICROgram(s)/Min (3 mL/Hr) IV Continuous <Continuous>  pantoprazole    Tablet 40 milliGRAM(s) Oral before breakfast  ticagrelor 90 milliGRAM(s) Oral two times a day  traZODone 50 milliGRAM(s) Oral at bedtime    MEDICATIONS  (PRN):  acetaminophen   Tablet .. 650 milliGRAM(s) Oral every 6 hours PRN Mild Pain (1 - 3)  clonazePAM Tablet 0.5 milliGRAM(s) Oral three times a day PRN anxiety  heparin  Injectable 6000 Unit(s) IV Push every 6 hours PRN For aPTT less than 40  zolpidem 5 milliGRAM(s) Oral at bedtime PRN Insomnia      Objective:  Vital Signs Last 24 Hrs  T(C): 36.9 (2019 04:00), Max: 37 (2019 19:00)  T(F): 98.4 (2019 04:00), Max: 98.6 (2019 19:00)  HR: 67 (2019 06:00) (58 - 76)  BP: 124/83 (2019 06:00) (90/62 - 124/83)  BP(mean): 93 (2019 06:00) (67 - 94)  RR: 15 (2019 06:00) (9 - 25)  SpO2: 96% (2019 06:00) (93% - 99%)  ICU Vital Signs Last 24 Hrs  T(C): 36.9 (2019 04:00), Max: 37 (2019 19:00)  T(F): 98.4 (2019 04:00), Max: 98.6 (2019 19:00)  HR: 67 (2019 06:00) (58 - 76)  BP: 124/83 (2019 06:00) (90/62 - 124/83)  BP(mean): 93 (2019 06:00) (67 - 94)  ABP: --  ABP(mean): --  RR: 15 (2019 06:00) (9 - 25)  SpO2: 96% (2019 06:00) (93% - 99%)      Adult Advanced Hemodynamics Last 24 Hrs  CVP(mm Hg): --  CVP(cm H2O): --  CO: --  CI: --  PA: --  PA(mean): --  PCWP: --  SVR: --  SVRI: --  PVR: --  PVRI: --       @ : @ 07:00  --------------------------------------------------------  IN: 240 mL / OUT: 0 mL / NET: 240 mL     @ 07: @ 06:54  --------------------------------------------------------  IN: 422 mL / OUT: 400 mL / NET: 22 mL      Daily     Daily Weight in k.5 (2019 16:45)    PHYSICAL EXAM:      Constitutional:    Eyes:    ENMT:    Neck:    Breasts:    Back:    Respiratory:    Cardiovascular:    Gastrointestinal:    Genitourinary:    Rectal:    Extremities:    Vascular:    Neurological:    Skin:    Lymph Nodes:    Musculoskeletal:    Psychiatric:        TELEMETRY:     EKG:     CARDIAC CATH:     ECHO:    IMAGIN.0   6.2   )-----------( 175      ( 2019 05:04 )             39.5         138  |  104  |  14  ----------------------------<  90  4.0   |  23  |  1.01    Ca    8.7      2019 16:47  Phos  3.8       Mg     2.1         TPro  7.1  /  Alb  4.2  /  TBili  0.4  /  DBili  x   /  AST  16  /  ALT  36  /  AlkPhos  73      LIVER FUNCTIONS - ( 2019 20:57 )  Alb: 4.2 g/dL / Pro: 7.1 g/dL / ALK PHOS: 73 U/L / ALT: 36 U/L / AST: 16 U/L / GGT: x           PT/INR - ( 2019 05:04 )   PT: 12.2 sec;   INR: 1.07 ratio         PTT - ( 2019 05:04 )  PTT:52.8 sec  Creatine Kinase, Serum: 119 U/L ( @ 05:04)  CKMB Units: 4.2 ng/mL ( @ 16:47)  Creatine Kinase, Serum: 156 U/L ( @ 16:47)  Creatine Kinase, Serum: 152 U/L ( @ 12:39)  CKMB Units: 5.1 ng/mL ( @ 12:39)      *BLOOD GAS/ARTERIAL/MIXED/VENOUS  *LACTATE      HEALTH ISSUES - PROBLEM Dx:  Prophylactic measure: Prophylactic measure  Hyperlipidemia: Hyperlipidemia  CAD (coronary artery disease): CAD (coronary artery disease)  NSTEMI (non-ST elevated myocardial infarction): NSTEMI (non-ST elevated myocardial infarction)  Need for prophylactic measure: Need for prophylactic measure  ADHD: ADHD  Insomnia: Insomnia  Mansfield esophagus: Mansfield esophagus  Depression: Depression  Anxiety: Anxiety  Essential hypertension: Essential hypertension  Coronary artery disease involving native coronary artery of native heart, angina presence unspecified: Coronary artery disease involving native coronary artery of native heart, angina presence unspecified  Chest pain: Chest pain        HEALTH ISSUES - R/O PROBLEM Dx:      TIGRE Chavez NP   # CHIEF COMPLAINT: Unstable Angina    INTERVAL HISTORY:  This is a 49 year old man with past medical history of CAD/MI s/p 5 stents (FITO x1 1st OM, x3 mCx 2014; FITO x1 pLAD ), HTN, ADHD, depression/anxiety p/w chest pain, ECG shows no ST changes, original Armond negative admitted to tele.  Accepted to CCU after worsening chest pain on 3dsu , placed on Tridil gtt, cardiac biomarkers positive.  Chest pain resolved with nitro and tridil gtt.  Tridil weaned to off.  Plan for Cath today.     REVIEW OF SYSTEMS: Denies CP, SOB, all others negative    MEDICATIONS  (STANDING):  aspirin enteric coated 81 milliGRAM(s) Oral daily  atorvastatin 80 milliGRAM(s) Oral at bedtime  escitalopram 20 milliGRAM(s) Oral daily  heparin  Infusion.  Unit(s)/Hr (10 mL/Hr) IV Continuous <Continuous>  metoclopramide 5 milliGRAM(s) Oral Before meals and at bedtime  nitroglycerin  Infusion 10 MICROgram(s)/Min (3 mL/Hr) IV Continuous <Continuous>  pantoprazole    Tablet 40 milliGRAM(s) Oral before breakfast  ticagrelor 90 milliGRAM(s) Oral two times a day  traZODone 50 milliGRAM(s) Oral at bedtime    MEDICATIONS  (PRN):  acetaminophen   Tablet .. 650 milliGRAM(s) Oral every 6 hours PRN Mild Pain (1 - 3)  clonazePAM Tablet 0.5 milliGRAM(s) Oral three times a day PRN anxiety  heparin  Injectable 6000 Unit(s) IV Push every 6 hours PRN For aPTT less than 40  zolpidem 5 milliGRAM(s) Oral at bedtime PRN Insomnia      Objective:  Vital Signs Last 24 Hrs  T(C): 36.9 (2019 04:00), Max: 37 (2019 19:00)  T(F): 98.4 (2019 04:00), Max: 98.6 (2019 19:00)  HR: 67 (2019 06:00) (58 - 76)  BP: 124/83 (2019 06:00) (90/62 - 124/83)  BP(mean): 93 (2019 06:00) (67 - 94)  RR: 15 (2019 06:00) (9 - 25)  SpO2: 96% (2019 06:00) (93% - 99%)  ICU Vital Signs Last 24 Hrs  T(C): 36.9 (2019 04:00), Max: 37 (2019 19:00)  T(F): 98.4 (2019 04:00), Max: 98.6 (2019 19:00)  HR: 67 (2019 06:00) (58 - 76)  BP: 124/83 (2019 06:00) (90/62 - 124/83)  BP(mean): 93 (2019 06:00) (67 - 94)  ABP: --  ABP(mean): --  RR: 15 (2019 06:00) (9 - 25)  SpO2: 96% (2019 06:00) (93% - 99%)      Adult Advanced Hemodynamics Last 24 Hrs  CVP(mm Hg): --  CVP(cm H2O): --  CO: --  CI: --  PA: --  PA(mean): --  PCWP: --  SVR: --  SVRI: --  PVR: --  PVRI: --       @ 07: @ 07:00  --------------------------------------------------------  IN: 240 mL / OUT: 0 mL / NET: 240 mL     @ 07: @ 06:54  --------------------------------------------------------  IN: 422 mL / OUT: 400 mL / NET: 22 mL      Daily     Daily Weight in k.5 (2019 16:45)    PHYSICAL EXAM:      Constitutional: No acute distress    Eyes: PERRL, EOMI    ENMT: Nml oral mucosa    Respiratory: CTA Bilaterally    Cardiovascular: S1S2 RRR    Gastrointestinal: +BS    Extremities: No pedal edema    Vascular: +2 pedal pulses    Neurological: A+OX3          TELEMETRY: SR    EKG:     CARDIAC CATH:     ECHO:    IMAGIN.0   6.2   )-----------( 175      ( 2019 05:04 )             39.5         138  |  104  |  14  ----------------------------<  90  4.0   |  23  |  1.01    Ca    8.7      2019 16:47  Phos  3.8       Mg     2.1         TPro  7.1  /  Alb  4.2  /  TBili  0.4  /  DBili  x   /  AST  16  /  ALT  36  /  AlkPhos  73      LIVER FUNCTIONS - ( 2019 20:57 )  Alb: 4.2 g/dL / Pro: 7.1 g/dL / ALK PHOS: 73 U/L / ALT: 36 U/L / AST: 16 U/L / GGT: x           PT/INR - ( 2019 05:04 )   PT: 12.2 sec;   INR: 1.07 ratio         PTT - ( 2019 05:04 )  PTT:52.8 sec  Creatine Kinase, Serum: 119 U/L ( @ 05:04)  CKMB Units: 4.2 ng/mL ( @ 16:47)  Creatine Kinase, Serum: 156 U/L ( @ 16:47)  Creatine Kinase, Serum: 152 U/L ( @ 12:39)  CKMB Units: 5.1 ng/mL ( @ 12:39)      *BLOOD GAS/ARTERIAL/MIXED/VENOUS  *LACTATE      HEALTH ISSUES - PROBLEM Dx:  Prophylactic measure: Prophylactic measure  Hyperlipidemia: Hyperlipidemia  CAD (coronary artery disease): CAD (coronary artery disease)  NSTEMI (non-ST elevated myocardial infarction): NSTEMI (non-ST elevated myocardial infarction)  Need for prophylactic measure: Need for prophylactic measure  ADHD: ADHD  Insomnia: Insomnia  Mansfield esophagus: Mansfield esophagus  Depression: Depression  Anxiety: Anxiety  Essential hypertension: Essential hypertension  Coronary artery disease involving native coronary artery of native heart, angina presence unspecified: Coronary artery disease involving native coronary artery of native heart, angina presence unspecified  Chest pain: Chest pain        HEALTH ISSUES - R/O PROBLEM Dx:      TIGRE Chavez NP   #[id number]

## 2021-10-02 ENCOUNTER — TRANSCRIPTION ENCOUNTER (OUTPATIENT)
Age: 51
End: 2021-10-02

## 2022-03-19 ENCOUNTER — TRANSCRIPTION ENCOUNTER (OUTPATIENT)
Age: 52
End: 2022-03-19

## 2022-04-06 ENCOUNTER — TRANSCRIPTION ENCOUNTER (OUTPATIENT)
Age: 52
End: 2022-04-06

## 2022-04-11 ENCOUNTER — TRANSCRIPTION ENCOUNTER (OUTPATIENT)
Age: 52
End: 2022-04-11

## 2022-07-12 NOTE — DIETITIAN INITIAL EVALUATION ADULT. - PROBLEM/PLAN-7
Detail Level: Simple Instructions: This plan will send the code FBSE to the PM system.  DO NOT or CHANGE the price. Price (Do Not Change): 0.00 DISPLAY PLAN FREE TEXT

## 2023-06-28 ENCOUNTER — NON-APPOINTMENT (OUTPATIENT)
Age: 53
End: 2023-06-28

## 2024-01-24 ENCOUNTER — NON-APPOINTMENT (OUTPATIENT)
Age: 54
End: 2024-01-24

## 2024-06-20 ENCOUNTER — NON-APPOINTMENT (OUTPATIENT)
Age: 54
End: 2024-06-20